# Patient Record
Sex: FEMALE | Race: ASIAN | NOT HISPANIC OR LATINO | Employment: UNEMPLOYED | URBAN - METROPOLITAN AREA
[De-identification: names, ages, dates, MRNs, and addresses within clinical notes are randomized per-mention and may not be internally consistent; named-entity substitution may affect disease eponyms.]

---

## 2017-07-13 ENCOUNTER — OFFICE VISIT (OUTPATIENT)
Dept: FAMILY MEDICINE | Facility: CLINIC | Age: 47
End: 2017-07-13
Payer: COMMERCIAL

## 2017-07-13 VITALS
HEART RATE: 63 BPM | HEIGHT: 61 IN | SYSTOLIC BLOOD PRESSURE: 104 MMHG | OXYGEN SATURATION: 99 % | WEIGHT: 151 LBS | BODY MASS INDEX: 28.51 KG/M2 | DIASTOLIC BLOOD PRESSURE: 74 MMHG | TEMPERATURE: 97 F

## 2017-07-13 DIAGNOSIS — R53.83 OTHER FATIGUE: ICD-10-CM

## 2017-07-13 DIAGNOSIS — N76.0 BV (BACTERIAL VAGINOSIS): ICD-10-CM

## 2017-07-13 DIAGNOSIS — E55.9 VITAMIN D DEFICIENCY: ICD-10-CM

## 2017-07-13 DIAGNOSIS — Z83.49 FAMILY HISTORY OF THYROID DISEASE: ICD-10-CM

## 2017-07-13 DIAGNOSIS — D25.9 UTERINE LEIOMYOMA, UNSPECIFIED LOCATION: ICD-10-CM

## 2017-07-13 DIAGNOSIS — Z80.3 FAMILY HISTORY OF BREAST CANCER: ICD-10-CM

## 2017-07-13 DIAGNOSIS — N89.8 VAGINAL ITCHING: ICD-10-CM

## 2017-07-13 DIAGNOSIS — B96.89 BV (BACTERIAL VAGINOSIS): ICD-10-CM

## 2017-07-13 DIAGNOSIS — E78.5 HYPERLIPIDEMIA LDL GOAL <130: ICD-10-CM

## 2017-07-13 DIAGNOSIS — Z00.00 ROUTINE GENERAL MEDICAL EXAMINATION AT A HEALTH CARE FACILITY: Primary | ICD-10-CM

## 2017-07-13 LAB
DEPRECATED CALCIDIOL+CALCIFEROL SERPL-MC: 16 UG/L (ref 20–75)
ERYTHROCYTE [DISTWIDTH] IN BLOOD BY AUTOMATED COUNT: 13.1 % (ref 10–15)
HCT VFR BLD AUTO: 40 % (ref 35–47)
HGB BLD-MCNC: 13.2 G/DL (ref 11.7–15.7)
MCH RBC QN AUTO: 27.6 PG (ref 26.5–33)
MCHC RBC AUTO-ENTMCNC: 33 G/DL (ref 31.5–36.5)
MCV RBC AUTO: 84 FL (ref 78–100)
MICRO REPORT STATUS: ABNORMAL
PLATELET # BLD AUTO: 168 10E9/L (ref 150–450)
RBC # BLD AUTO: 4.79 10E12/L (ref 3.8–5.2)
SPECIMEN SOURCE: ABNORMAL
WBC # BLD AUTO: 5 10E9/L (ref 4–11)
WET PREP SPEC: ABNORMAL

## 2017-07-13 PROCEDURE — 99213 OFFICE O/P EST LOW 20 MIN: CPT | Mod: 25 | Performed by: FAMILY MEDICINE

## 2017-07-13 PROCEDURE — 36415 COLL VENOUS BLD VENIPUNCTURE: CPT | Performed by: FAMILY MEDICINE

## 2017-07-13 PROCEDURE — 82306 VITAMIN D 25 HYDROXY: CPT | Performed by: FAMILY MEDICINE

## 2017-07-13 PROCEDURE — 85027 COMPLETE CBC AUTOMATED: CPT | Performed by: FAMILY MEDICINE

## 2017-07-13 PROCEDURE — G0145 SCR C/V CYTO,THINLAYER,RESCR: HCPCS | Performed by: FAMILY MEDICINE

## 2017-07-13 PROCEDURE — 87624 HPV HI-RISK TYP POOLED RSLT: CPT | Performed by: FAMILY MEDICINE

## 2017-07-13 PROCEDURE — 84443 ASSAY THYROID STIM HORMONE: CPT | Performed by: FAMILY MEDICINE

## 2017-07-13 PROCEDURE — 99396 PREV VISIT EST AGE 40-64: CPT | Performed by: FAMILY MEDICINE

## 2017-07-13 PROCEDURE — 80061 LIPID PANEL: CPT | Performed by: FAMILY MEDICINE

## 2017-07-13 PROCEDURE — 87210 SMEAR WET MOUNT SALINE/INK: CPT | Performed by: FAMILY MEDICINE

## 2017-07-13 PROCEDURE — 80053 COMPREHEN METABOLIC PANEL: CPT | Performed by: FAMILY MEDICINE

## 2017-07-13 RX ORDER — METRONIDAZOLE 7.5 MG/G
1 GEL VAGINAL AT BEDTIME
Qty: 70 G | Refills: 0 | Status: SHIPPED | OUTPATIENT
Start: 2017-07-13 | End: 2017-07-18

## 2017-07-13 NOTE — MR AVS SNAPSHOT
After Visit Summary   7/13/2017    Giuliana Cameron    MRN: [id number]           Patient Information     Date Of Birth          1970        Visit Information        Provider Department      7/13/2017 8:30 AM [place]eto [place] [facility] [place]        Today's Diagnoses     Routine general medical examination at a health care facility    -  1    Family history of breast cancer        Other fatigue        Hyperlipidemia LDL goal <130        Vitamin D deficiency        Family history of thyroid disease        BMI 28.0-28.9,adult        Vaginal itching        Uterine leiomyoma, unspecified location          Care Instructions      Preventive Health Recommendations  Female Ages 40 to 49    Yearly exam:     See your health care provider every year in order to  1. Review health changes.   2. Discuss preventive care.    3. Review your medicines if your doctor prescribed any.      Get a Pap test every three years (unless you have an abnormal result and your provider advises testing more often).      If you get Pap tests with HPV test, you only need to test every 5 years, unless you have an abnormal result. You do not need a Pap test if your uterus was removed (hysterectomy) and you have not had cancer.      You should be tested each year for STDs (sexually transmitted diseases), if you're at risk.       Ask your doctor if you should have a mammogram.      Have a colonoscopy (test for colon cancer) if someone in your family has had colon cancer or polyps before age 50.       Have a cholesterol test every 5 years.       Have a diabetes test (fasting glucose) after age 45. If you are at risk for diabetes, you should have this test every 3 years.    Shots: Get a flu shot each year. Get a tetanus shot every 10 years.     Nutrition:     Eat at least 5 servings of fruits and vegetables each day.    Eat whole-grain bread, whole-wheat pasta and brown rice instead of white grains and  rice.    Talk to your provider about Calcium and Vitamin D.     Lifestyle    Exercise at least 150 minutes a week (an average of 30 minutes a day, 5 days a week). This will help you control your weight and prevent disease.    Limit alcohol to one drink per day.    No smoking.     Wear sunscreen to prevent skin cancer.    See your dentist every six months for an exam and cleaning.          Follow-ups after your visit        Future tests that were ordered for you today     Open Future Orders        Priority Expected Expires Ordered    *MA Screening Digital Bilateral Routine  7/13/2018 7/13/2017            Who to contact     If you have questions or need follow up information about today's clinic visit or your schedule please contact [facility] [place] directly at [phone].  Normal or non-critical lab and imaging results will be communicated to you by Squrlhart, letter or phone within 4 business days after the clinic has received the results. If you do not hear from us within 7 days, please contact the clinic through Squrlhart or phone. If you have a critical or abnormal lab result, we will notify you by phone as soon as possible.  Submit refill requests through Rank By Search or call your pharmacy and they will forward the refill request to us. Please allow 3 business days for your refill to be completed.          Additional Information About Your Visit        Rank By Search Information     Rank By Search gives you secure access to your electronic health record. If you see a primary care provider, you can also send messages to your care team and make appointments. If you have questions, please call your primary care clinic.  If you do not have a primary care provider, please call [phone] and they will assist you.        Care EveryWhere ID     This is your Care EveryWhere ID. This could be used by other organizations to access your [facility] medical records  [id number]        Your Vitals Were     Pulse Temperature Height  "Last Period Pulse Oximetry BMI (Body Mass Index)    63 97  F (36.1  C) (Tympanic) 5' 1\" (1.549 m) 06/20/2017 (Approximate) 99% 28.53 kg/m2       Blood Pressure from Last 3 Encounters:   07/13/17 104/74   03/16/16 108/78   02/09/15 100/70    Weight from Last 3 Encounters:   07/13/17 151 lb (68.5 kg)   03/16/16 150 lb (68 kg)   02/09/15 152 lb (68.9 kg)              We Performed the Following     CBC with platelets     Comprehensive metabolic panel     HPV High Risk Types DNA Cervical     Lipid panel reflex to direct LDL     Pap imaged thin layer screen with HPV - recommended age 30 - 65 years (select HPV order below)     TSH with free T4 reflex     Vitamin D Deficiency     Wet prep        Primary Care Provider Office Phone # Fax #    June Gregg Prieto [phone]       [facility] [address]  [place] [address]        Equal Access to Services     DANETTE GU : Hadii aad ku hadasho Soomaali, waaxda luqadaha, qaybta kaalmada adeegyada, waxay idiin haytawanan ronak manjarrez . So [facility] [phone].    ATENCIÓN: Si habla español, tiene a ferro disposición servicios gratuitos de asistencia lingüística. Llame al [phone].    We comply with applicable federal civil rights laws and [place] laws. We do not discriminate on the basis of race, color, national origin, age, disability sex, sexual orientation or gender identity.            Thank you!     Thank you for choosing [facility]  for your [facility]. Our goal is always to provide you with excellent care. Hearing back from our patients is one way we can continue to improve our services. Please take a few minutes to complete the written survey that you may receive in the mail after your visit with us. Thank you!             Your Updated Medication List - Protect others around you: Learn how to safely use, store and throw away your medicines at www.disposemymeds.org.          This list is accurate as of: 7/13/17  " 9:16 AM.  Always use your most recent med list.                   Brand Name Dispense Instructions for use Diagnosis    MULTIPLE VITAMIN PO       Routine general medical examination at a health care facility

## 2017-07-13 NOTE — NURSING NOTE
"Chief Complaint   Patient presents with     Physical       Initial /74  Pulse 63  Temp 97  F (36.1  C) (Tympanic)  Ht 5' 1\" (1.549 m)  Wt 151 lb (68.5 kg)  LMP 06/20/2017 (Approximate)  SpO2 99%  BMI 28.53 kg/m2 Estimated body mass index is 28.53 kg/(m^2) as calculated from the following:    Height as of this encounter: 5' 1\" (1.549 m).    Weight as of this encounter: 151 lb (68.5 kg).  Medication Reconciliation: complete  "

## 2017-07-13 NOTE — PROGRESS NOTES
SUBJECTIVE:   CC: Giuliana Cameron is an 46 year old woman who presents for preventive health visit.      Healthy Habits:    Do you get at least three servings of calcium containing foods daily (dairy, green leafy vegetables, etc.)? no, taking calcium and/or vitamin D supplement: no    Amount of exercise or daily activities, outside of work: 3-4 day(s) per week    Problems taking medications regularly not applicable    Medication side effects: No    Have you had an eye exam in the past two years? yes    Do you see a dentist twice per year? yes    Do you have sleep apnea, excessive snoring or daytime drowsiness?yes excessive snoring   Patient says she gotten her period for the first time in 8 months 6/20/2017      PROBLEMS TO ADD ON...     fatigue, weight gain       Duration: ongoing past  several  months , has gained weight and feeling more tired then usual has fam hx of thyroid problem and 2 sisters were recently diagnosed , so worried .     Her menstrual cycle has changed becoming less frequent she skipped for few months but last month had one light period, so she thinks she is going through menopause / she has some hot flashes and sometimes feels sensitive to heat and cold etc. Had low vit-d in the past , had stopped taking supplement so just concerned     Has itchy ears sometimes although no other allergies but just wants ears checked       Today's PHQ-2 Score:   PHQ-2 ( 1999 [employer]) 7/13/2017 3/16/2016   Q1: Little interest or pleasure in doing things 0 0   Q2: Feeling down, depressed or hopeless 0 0   PHQ-2 Score 0 0   Q1: Little interest or pleasure in doing things - -   Q2: Feeling down, depressed or hopeless - -   PHQ-2 Score - -       Abuse: Current or Past(Physical, Sexual or Emotional)- No  Do you feel safe in your environment - Yes    Social History   Substance Use Topics     Smoking status: Never Smoker     Smokeless tobacco: Never Used     Alcohol use No     The patient does not drink >3 drinks  per day nor >7 drinks per week.    Reviewed orders with patient.  Reviewed health maintenance and updated orders accordingly - Yes  Patient Active Problem List   Diagnosis     Dyspepsia and other specified disorders of function of stomach     Allergic rhinitis     Neck pain     Missed period     Sore throat, chronic     Family history of diabetes mellitus (DM)     Esophageal reflux     BMI 28.0-28.9,adult     Hyperlipidemia LDL goal <130     Gastroesophageal reflux disease, esophagitis presence not specified     Chest wall pain     Vitamin D deficiency     Irregular menstrual cycle     Past Surgical History:   Procedure Laterality Date     C  DELIVERY ONLY      , Low Cervical     LAPAROSCOPIC CHOLECYSTECTOMY      Cholecystectomy, Laparoscopic       Social History   Substance Use Topics     Smoking status: Never Smoker     Smokeless tobacco: Never Used     Alcohol use No     Family History   Problem Relation Age of Onset     DIABETES Mother      Hypertension Mother      DIABETES Father      Hypertension Father      C.A.D. Father       of MI at age of 70     Breast Cancer Maternal Grandmother      at age 65+, unsure ?     Breast Cancer Paternal Grandmother      55+     Cancer - colorectal No family hx of            Patient under age 50, mutual decision reflected in health maintenance.      Pertinent mammograms are reviewed under the imaging tab.  History of abnormal Pap smear: NO - age 21-29 PAP every 3 years recommended    Reviewed and updated as needed this visit by clinical staff         Reviewed and updated as needed this visit by Provider        Past Medical History:   Diagnosis Date     Dyspepsia and other specified disorders of function of stomach     pos H.pylori tx'ed     Vitamin D deficiencies         ROS:  C: NEGATIVE for fever, chills, change in weight  CONSTITUTIONAL:POSITIVE  for fatigue and weight gain and NEGATIVE  for anorexia, arthralgias, chills, fever , malaise,  myalgias and sweats  I: NEGATIVE for worrisome rashes, moles or lesions  E: NEGATIVE for vision changes or irritation  ENT: NEGATIVE for ear, mouth and throat problems  R: NEGATIVE for significant cough or SOB  B: NEGATIVE for masses, tenderness or discharge  CV: NEGATIVE for chest pain, palpitations or peripheral edema  GI: NEGATIVE for nausea, abdominal pain, heartburn, or change in bowel habits  : NEGATIVE for unusual urinary or vaginal symptoms except soemtimes itching and feel irritated on and off . Periods are not  Regular and changing , less frequent   M: NEGATIVE for significant arthralgias or myalgia  N: NEGATIVE for weakness, dizziness or paresthesias  ENDOCRINE: as per HPI   P: NEGATIVE for changes in mood or affect    OBJECTIVE:   There were no vitals taken for this visit.  EXAM:  GENERAL: healthy, alert and no distress  EYES: Eyes grossly normal to inspection, PERRL and conjunctivae and sclerae normal  HENT: ear canals and TM's normal, nose and mouth without ulcers or lesions  NECK: no adenopathy, no asymmetry, masses, or scars and thyroid normal to palpation  RESP: lungs clear to auscultation - no rales, rhonchi or wheezes  BREAST: normal without masses, tenderness or nipple discharge and no palpable axillary masses or adenopathy  CV: regular rate and rhythm, normal S1 S2, no S3 or S4, no murmur, click or rub, no peripheral edema   ABDOMEN: soft, nontender, no hepatosplenomegaly, no masses and bowel sounds normal   (female): normal female external genitalia, normal urethral meatus , vaginal mucosa pink, moist, well rugated and normal cervix, adnexae, and uterus slightly bulky without masses.small vaginal discharge  with odor, so wet prep taken   RECTAL: normal sphincter tone, no rectal masses  MS: no gross musculoskeletal defects noted, no edema  SKIN: no suspicious lesions or rashes  NEURO: Normal strength and tone, mentation intact and speech normal  PSYCH: mentation appears normal, affect  "normal/bright    ASSESSMENT/PLAN:   (Z00.00) Routine general medical examination at a health care facility  (primary encounter diagnosis)  Comment:   Plan: *MA Screening Digital Bilateral, Lipid panel         reflex to direct LDL, Pap imaged thin layer         screen with HPV - recommended age 30 - 65 years        (select HPV order below), HPV High Risk Types         DNA Cervical            (Z80.3) Family history of breast cancer  Comment: mgm, pgm  Plan: *MA Screening Digital Bilateral            (R53.83) Other fatigue  Comment:   Plan: TSH with free T4 reflex, Vitamin D Deficiency,         Comprehensive metabolic panel, CBC with         platelets            (E78.5) Hyperlipidemia LDL goal <130  Comment:   Plan:     (E55.9) Vitamin D deficiency  Comment:   Plan:     (Z83.49) Family history of thyroid disease  Comment: 2 sisters , low thyroid   Plan: TSH with free T4 reflex            (Z68.28) BMI 28.0-28.9,adult  Comment:   Plan: Healthy diet and exercise reviewed.    Risks of obesity discussed.  Encourage exercise.      (L29.8) Vaginal itching  Comment:   Plan: Wet prep          (N76.0,  B96.89) BV (bacterial vaginosis)  Comment:   Plan: metroNIDAZOLE (METROGEL) 0.75 % vaginal gel    (D25.9) Uterine leiomyoma, unspecified location  Comment: per previous u/s, asymptomatic   Plan: ok to watch     COUNSELING:   Reviewed preventive health counseling, as reflected in patient instructions       Regular exercise       Healthy diet/nutrition       Vision screening         reports that she has never smoked. She has never used smokeless tobacco.    Estimated body mass index is 28.11 kg/(m^2) as calculated from the following:    Height as of 3/16/16: 5' 1.25\" (1.556 m).    Weight as of 3/16/16: 150 lb (68 kg).   Weight management plan: Discussed healthy diet and exercise guidelines and patient will follow up in 12 months in clinic to re-evaluate.      HCM issues discussed. Diet/ exercise discussed. Check labs , call patiens " with results. follow up as needed.       Counseling Resources:  ATP IV Guidelines  Pooled Cohorts Equation Calculator  Breast Cancer Risk Calculator  FRAX Risk Assessment  ICSI Preventive Guidelines  Dietary Guidelines for Americans, 2010  USDA's MyPlate  ASA Prophylaxis  Lung CA Screening    June Prieto MD  [facility]

## 2017-07-14 LAB
ALBUMIN SERPL-MCNC: 4.2 G/DL (ref 3.4–5)
ALP SERPL-CCNC: 91 U/L (ref 40–150)
ALT SERPL W P-5'-P-CCNC: 52 U/L (ref 0–50)
ANION GAP SERPL CALCULATED.3IONS-SCNC: 9 MMOL/L (ref 3–14)
AST SERPL W P-5'-P-CCNC: 28 U/L (ref 0–45)
BILIRUB SERPL-MCNC: 0.4 MG/DL (ref 0.2–1.3)
BUN SERPL-MCNC: 9 MG/DL (ref 7–30)
CALCIUM SERPL-MCNC: 9 MG/DL (ref 8.5–10.1)
CHLORIDE SERPL-SCNC: 105 MMOL/L (ref 94–109)
CHOLEST SERPL-MCNC: 204 MG/DL
CO2 SERPL-SCNC: 27 MMOL/L (ref 20–32)
CREAT SERPL-MCNC: 0.7 MG/DL (ref 0.52–1.04)
GFR SERPL CREATININE-BSD FRML MDRD: 88 ML/MIN/1.7M2
GLUCOSE SERPL-MCNC: 91 MG/DL (ref 70–99)
HDLC SERPL-MCNC: 53 MG/DL
LDLC SERPL CALC-MCNC: 127 MG/DL
NONHDLC SERPL-MCNC: 151 MG/DL
POTASSIUM SERPL-SCNC: 4.3 MMOL/L (ref 3.4–5.3)
PROT SERPL-MCNC: 7.7 G/DL (ref 6.8–8.8)
SODIUM SERPL-SCNC: 141 MMOL/L (ref 133–144)
TRIGL SERPL-MCNC: 121 MG/DL
TSH SERPL DL<=0.005 MIU/L-ACNC: 1.83 MU/L (ref 0.4–4)

## 2017-07-17 LAB
COPATH REPORT: NORMAL
PAP: NORMAL

## 2017-07-19 ENCOUNTER — MYC MEDICAL ADVICE (OUTPATIENT)
Dept: FAMILY MEDICINE | Facility: CLINIC | Age: 47
End: 2017-07-19

## 2017-07-19 LAB
FINAL DIAGNOSIS: NORMAL
HPV HR 12 DNA CVX QL NAA+PROBE: NEGATIVE
HPV16 DNA SPEC QL NAA+PROBE: NEGATIVE
HPV18 DNA SPEC QL NAA+PROBE: NEGATIVE
SPECIMEN DESCRIPTION: NORMAL

## 2017-07-20 ENCOUNTER — TELEPHONE (OUTPATIENT)
Dept: FAMILY MEDICINE | Facility: CLINIC | Age: 47
End: 2017-07-20

## 2017-07-20 NOTE — TELEPHONE ENCOUNTER
Please review labs and advise,  No lab notes  Bharti Cortés RN - Triage  [facility]

## 2017-07-20 NOTE — TELEPHONE ENCOUNTER
Spoke with patient, she was not finding glucose in ordered labs. We discussed it was in the metabolic panel and results were normal. Patient/ parent verbalized understanding and agrees with plan.    Ngoc Garcia RN   [facility] - Triage

## 2017-07-20 NOTE — TELEPHONE ENCOUNTER
Reason for Call:  Request for results:    Name of test or procedure: lab results    Date of test of procedure: 07/13/17    Location of the test or procedure: [facility]    OK to leave the result message on voice mail or with a family member? YES    Phone number Patient can be reached at:  Cell number on file:    Telephone Information:   Mobile [phone]       Additional comments: Patient got a message that her lab results were now viewable in [employer]. Patient states she cannot find some of her labs that were supposed to be done. Please call patient back to discuss.    Call taken on 7/20/2017 at 4:28 PM by Jeanette Abarca

## 2017-08-18 ENCOUNTER — TELEPHONE (OUTPATIENT)
Dept: FAMILY MEDICINE | Facility: CLINIC | Age: 47
End: 2017-08-18

## 2017-08-22 NOTE — TELEPHONE ENCOUNTER
Form faxed to [facility] [phone]  Original and confirmation fax mailed to patient  Copy sent to stat abstracting  Nicole Cevallos TC

## 2017-12-07 ENCOUNTER — HOSPITAL ENCOUNTER (OUTPATIENT)
Dept: MAMMOGRAPHY | Facility: CLINIC | Age: 47
Discharge: HOME | End: 2017-12-07
Attending: FAMILY MEDICINE | Admitting: FAMILY MEDICINE
Payer: COMMERCIAL

## 2017-12-07 DIAGNOSIS — Z80.3 FAMILY HISTORY OF BREAST CANCER: ICD-10-CM

## 2017-12-07 DIAGNOSIS — Z00.00 ROUTINE GENERAL MEDICAL EXAMINATION AT A HEALTH CARE FACILITY: ICD-10-CM

## 2017-12-07 PROCEDURE — G0202 SCR MAMMO BI INCL CAD: HCPCS

## 2018-04-24 ENCOUNTER — OFFICE VISIT (OUTPATIENT)
Dept: FAMILY MEDICINE | Facility: CLINIC | Age: 48
End: 2018-04-24
Payer: COMMERCIAL

## 2018-04-24 VITALS
DIASTOLIC BLOOD PRESSURE: 73 MMHG | HEIGHT: 61 IN | WEIGHT: 155 LBS | HEART RATE: 78 BPM | SYSTOLIC BLOOD PRESSURE: 109 MMHG | TEMPERATURE: 98 F | OXYGEN SATURATION: 100 % | BODY MASS INDEX: 29.27 KG/M2

## 2018-04-24 DIAGNOSIS — R22.9 LOCALIZED SUPERFICIAL SWELLING, MASS, OR LUMP: Primary | ICD-10-CM

## 2018-04-24 PROCEDURE — 99213 OFFICE O/P EST LOW 20 MIN: CPT | Performed by: FAMILY MEDICINE

## 2018-04-24 NOTE — MR AVS SNAPSHOT
After Visit Summary   4/24/2018    Giuliana Cameron    MRN: [id number]           Patient Information     Date Of Birth          1970        Visit Information        Provider Department      4/24/2018 1:00 PM June Prieto MD [facility] [place]        Today's Diagnoses     Localized superficial swelling, mass, or lump    -  1      Care Instructions    Proceed with u/s  Cares and symptomatic treatment discussed follow up if problem               Follow-ups after your visit        Follow-up notes from your care team     Return in about 1 month (around 5/24/2018), or sooner if problem .      Future tests that were ordered for you today     Open Future Orders        Priority Expected Expires Ordered    POC US SOFT TISSUE Routine 4/24/2018 4/28/2018 4/24/2018            Who to contact     If you have questions or need follow up information about today's clinic visit or your schedule please contact [facility] [place] directly at [phone].  Normal or non-critical lab and imaging results will be communicated to you by Bluefin Labshart, letter or phone within 4 business days after the clinic has received the results. If you do not hear from us within 7 days, please contact the clinic through Bluefin Labshart or phone. If you have a critical or abnormal lab result, we will notify you by phone as soon as possible.  Submit refill requests through [employer]L3B Digital or call your pharmacy and they will forward the refill request to us. Please allow 3 business days for your refill to be completed.          Additional Information About Your Visit        MyChart Information     [employer] Digital gives you secure access to your electronic health record. If you see a primary care provider, you can also send messages to your care team and make appointments. If you have questions, please call your primary care clinic.  If you do not have a primary care provider, please call [phone] and they will assist you.       "  Care EveryWhere ID     This is your Care EveryWhere ID. This could be used by other organizations to access your [facility] medical records  [id number]        Your Vitals Were     Pulse Temperature Height Pulse Oximetry BMI (Body Mass Index)       78 98  F (36.7  C) (Tympanic) 5' 1\" (1.549 m) 100% 29.29 kg/m2        Blood Pressure from Last 3 Encounters:   04/24/18 109/73   07/13/17 104/74   03/16/16 108/78    Weight from Last 3 Encounters:   04/24/18 155 lb (70.3 kg)   07/13/17 151 lb (68.5 kg)   03/16/16 150 lb (68 kg)               Primary Care Provider Office Phone # Fax #    June Gregg Prieto [phone]        [address]  [place] [address]        Equal Access to Services     [facility]: Hadii aad ku hadasho Soomaali, waaxda luqadaha, qaybta kaalmada adeegyada, waxay blairin haytawanan ronak jainn . So [facility] [phone].    ATENCIÓN: Si habla español, tiene a ferro disposición servicios gratuitos de asistencia lingüística. Llame al [phone].    We comply with applicable federal civil rights laws and [place] laws. We do not discriminate on the basis of race, color, national origin, age, disability, sex, sexual orientation, or gender identity.            Thank you!     Thank you for choosing [facility] [place]  for your care. Our goal is always to provide you with excellent care. Hearing back from our patients is one way we can continue to improve our services. Please take a few minutes to complete the written survey that you may receive in the mail after your visit with us. Thank you!             Your Updated Medication List - Protect others around you: Learn how to safely use, store and throw away your medicines at www.disposemymeds.org.      Notice  As of 4/24/2018  1:42 PM    You have not been prescribed any medications.      "

## 2018-04-24 NOTE — PROGRESS NOTES
SUBJECTIVE:   Giuliana Cameron is a 47 year old female who presents to clinic today for the following health issues:      Derm Problem       Duration: ongoing 20+ years     Description  Location: right armpit , has it for 15-20 yrs may be,although it feels like it may been changed lately and feeling more tender and painful sometimes more of  mid discomfort and  it feel different. So just concerned .   Tenderness: mild  Growing in size: Yes . no correlation with menstrual cycle is changes although she thinsk she is going rough menopause as has not period was 4-5 months ago no ht flashes  etc . Denies any breast lump or breast tenderness. Had normal  mammogram in 2017      Intensity:      Accompanying signs and symptoms: None    History (similar episodes/previous evaluation): None    Precipitating or alleviating factors:  New exposures:  None  Recent travel: no      Therapies tried and outcome: none      Problem list and histories reviewed & adjusted, as indicated.  Additional history: as documented    Patient Active Problem List   Diagnosis     Dyspepsia and other specified disorders of function of stomach     Allergic rhinitis     Neck pain     Sore throat, chronic     Family history of diabetes mellitus (DM)     Esophageal reflux     BMI 28.0-28.9,adult     Hyperlipidemia LDL goal <130     Gastroesophageal reflux disease, esophagitis presence not specified     Chest wall pain     Vitamin D deficiency     Irregular menstrual cycle     Family history of thyroid disease     Uterine leiomyoma, unspecified location     Past Surgical History:   Procedure Laterality Date     C  DELIVERY ONLY      , Low Cervical     LAPAROSCOPIC CHOLECYSTECTOMY      Cholecystectomy, Laparoscopic       Social History   Substance Use Topics     Smoking status: Never Smoker     Smokeless tobacco: Never Used     Alcohol use No     Family History   Problem Relation Age of Onset     DIABETES Mother       "Hypertension Mother      DIABETES Father      Hypertension Father      C.A.D. Father       of MI at age of 70     Breast Cancer Maternal Grandmother      at age 65+, unsure ?     Breast Cancer Paternal Grandmother      55+     Cancer - colorectal No family hx of            Reviewed and updated as needed this visit by clinical staff       Reviewed and updated as needed this visit by Provider         ROS:  Constitutional, HEENT, cardiovascular, pulmonary, GI, , musculoskeletal, neuro, skin, endocrine and psych systems are negative, except as otherwise noted.    OBJECTIVE:     /73  Pulse 78  Temp 98  F (36.7  C) (Tympanic)  Ht 5' 1\" (1.549 m)  Wt 155 lb (70.3 kg)  SpO2 100%  BMI 29.29 kg/m2  Body mass index is 29.29 kg/(m^2).  GENERAL: healthy, alert and no distress  NECK: no adenopathy, no asymmetry,   RESP: lungs clear to auscultation - no rales, rhonchi or wheezes  BREAST: normal without masses, tenderness or nipple discharge and no palpable axillary masses or adenopathy  Has soft tissue lump left axilla anteriorly / adjacent to chest, it does look more prominent as compared her other side she is somewhat tender in that area,  Suspect lipoma ?   ROM of shoulder does not aggravate any pain   CV: regular rate and rhythm, normal S1 S2, no S3 or S4, no murmur, click or rub, no peripheral edema and peripheral pulses strong  ABDOMEN: soft, nontender, no hepatosplenomegaly, no masses and bowel sounds normal            ASSESSMENT/PLAN:       (R22.9) Localized superficial swelling, mass, or lump  (primary encounter diagnosis)  Comment: rt axilla/ breast area , likely lipoma  Plan: POC US SOFT TISSUE          Has lump left axilla anteriorly / adjacent to chest, breast- for many  years, increased in size and painful lately. Suspect lipoma ? Vs other      proceed with evaluation with u/s  . Ok to take OTC pain med's as needed .   Cares and symptomatic treatment discussed follow up if problem       Patient " expressed understanding and agreement with treatment plan. All patient's questions were answered, will let me know if has more later.  Medications: Rx's: Reviewed the potential side effects/complications of medications prescribed.         June Prieto MD  [facility] [place]

## 2018-04-24 NOTE — NURSING NOTE
"Chief Complaint   Patient presents with     Derm Problem       Initial /73  Pulse 78  Temp 98  F (36.7  C) (Tympanic)  Ht 5' 1\" (1.549 m)  Wt 155 lb (70.3 kg)  SpO2 100%  BMI 29.29 kg/m2 Estimated body mass index is 29.29 kg/(m^2) as calculated from the following:    Height as of this encounter: 5' 1\" (1.549 m).    Weight as of this encounter: 155 lb (70.3 kg).  Medication Reconciliation: complete  "

## 2018-04-25 ENCOUNTER — TELEPHONE (OUTPATIENT)
Dept: FAMILY MEDICINE | Facility: CLINIC | Age: 48
End: 2018-04-25

## 2018-04-25 NOTE — TELEPHONE ENCOUNTER
Reason for Call:  Other- Order    Detailed comments: Pt need order to be faxed to [facility] from Dr Prieto to make an appt, she is unable to make an appt because [facility] does not have order.    Phone Number Patient can be reached at: [phone]    Best Time: anytime    Can we leave a detailed message on this number? YES    Call taken on 4/25/2018 at 6:33 PM by Kiley Godinez

## 2018-04-26 NOTE — TELEPHONE ENCOUNTER
Order was placed with no location entered; could of been due to where patient could get it done at  TC faxed order to [facility] [phone]  Nicole STYLES

## 2018-05-01 ENCOUNTER — TRANSFERRED RECORDS (OUTPATIENT)
Dept: HEALTH INFORMATION MANAGEMENT | Facility: CLINIC | Age: 48
End: 2018-05-01

## 2018-05-02 ENCOUNTER — TELEPHONE (OUTPATIENT)
Dept: FAMILY MEDICINE | Facility: CLINIC | Age: 48
End: 2018-05-02

## 2018-05-02 NOTE — TELEPHONE ENCOUNTER
Please let her know that her ultrasound is normal    Ultrasound shows asymmetric fat deposition,  no discrete lipoma or other soft tissue mass lesion detected in the area of her concern.

## 2018-05-02 NOTE — TELEPHONE ENCOUNTER
I guess I am not sure if insurance would approve, bc it may come under cosmetic surgery.   May be have her schedule follow up visit to discuss in further detail, to see if we can help her make a case for her for insurance coverage

## 2018-05-02 NOTE — TELEPHONE ENCOUNTER
Patient notified of information noted below from provider and agrees with plan.   Janea Hernandez RN Triage  [facility]

## 2018-05-02 NOTE — TELEPHONE ENCOUNTER
Patient notified with information noted below from provider and would like to know where to go from here. She is uncomfortable on that side and would like to know if possible to have surgically removed or how to proceed?    Please advise,  Bharti Cortés RN - Triage  [facility]

## 2019-03-06 ENCOUNTER — TELEPHONE (OUTPATIENT)
Dept: FAMILY MEDICINE | Facility: CLINIC | Age: 49
End: 2019-03-06

## 2019-03-06 NOTE — TELEPHONE ENCOUNTER
TIAGO Prieto    Patient had wellness check at her work, [employer].   Patient states that her glucose and BP are fine. Total cholesterol 237, HDL 61, , Ratio 3.9, Triglycerides 157.    Patient scheduled with Dr. Prieto for 4/18. States that she will work on diet and exercise until that time.  Marlin Booth RN

## 2019-04-18 ENCOUNTER — OFFICE VISIT (OUTPATIENT)
Dept: FAMILY MEDICINE | Facility: CLINIC | Age: 49
End: 2019-04-18
Payer: COMMERCIAL

## 2019-04-18 VITALS
DIASTOLIC BLOOD PRESSURE: 72 MMHG | TEMPERATURE: 98.3 F | HEART RATE: 88 BPM | SYSTOLIC BLOOD PRESSURE: 118 MMHG | WEIGHT: 155 LBS | BODY MASS INDEX: 29.27 KG/M2 | OXYGEN SATURATION: 98 % | HEIGHT: 61 IN

## 2019-04-18 DIAGNOSIS — E78.5 HYPERLIPIDEMIA LDL GOAL <130: ICD-10-CM

## 2019-04-18 DIAGNOSIS — Z00.00 ROUTINE HISTORY AND PHYSICAL EXAMINATION OF ADULT: Primary | ICD-10-CM

## 2019-04-18 DIAGNOSIS — R53.83 OTHER FATIGUE: ICD-10-CM

## 2019-04-18 DIAGNOSIS — R06.83 SNORING: ICD-10-CM

## 2019-04-18 DIAGNOSIS — K21.9 GASTROESOPHAGEAL REFLUX DISEASE, ESOPHAGITIS PRESENCE NOT SPECIFIED: ICD-10-CM

## 2019-04-18 DIAGNOSIS — Z12.11 COLON CANCER SCREENING: ICD-10-CM

## 2019-04-18 DIAGNOSIS — R09.81 NASAL CONGESTION: ICD-10-CM

## 2019-04-18 DIAGNOSIS — E55.9 VITAMIN D DEFICIENCY: ICD-10-CM

## 2019-04-18 PROBLEM — Z78.0 MENOPAUSE PRESENT: Status: ACTIVE | Noted: 2019-04-18

## 2019-04-18 LAB
ERYTHROCYTE [DISTWIDTH] IN BLOOD BY AUTOMATED COUNT: 13.2 % (ref 10–15)
HCT VFR BLD AUTO: 40.5 % (ref 35–47)
HGB BLD-MCNC: 13.7 G/DL (ref 11.7–15.7)
MCH RBC QN AUTO: 27.9 PG (ref 26.5–33)
MCHC RBC AUTO-ENTMCNC: 33.8 G/DL (ref 31.5–36.5)
MCV RBC AUTO: 83 FL (ref 78–100)
PLATELET # BLD AUTO: 187 10E9/L (ref 150–450)
RBC # BLD AUTO: 4.91 10E12/L (ref 3.8–5.2)
WBC # BLD AUTO: 5.8 10E9/L (ref 4–11)

## 2019-04-18 PROCEDURE — 85027 COMPLETE CBC AUTOMATED: CPT | Performed by: FAMILY MEDICINE

## 2019-04-18 PROCEDURE — 82274 ASSAY TEST FOR BLOOD FECAL: CPT | Performed by: FAMILY MEDICINE

## 2019-04-18 PROCEDURE — 84443 ASSAY THYROID STIM HORMONE: CPT | Performed by: FAMILY MEDICINE

## 2019-04-18 PROCEDURE — 80053 COMPREHEN METABOLIC PANEL: CPT | Performed by: FAMILY MEDICINE

## 2019-04-18 PROCEDURE — 99396 PREV VISIT EST AGE 40-64: CPT | Performed by: FAMILY MEDICINE

## 2019-04-18 PROCEDURE — 80061 LIPID PANEL: CPT | Performed by: FAMILY MEDICINE

## 2019-04-18 PROCEDURE — 82306 VITAMIN D 25 HYDROXY: CPT | Performed by: FAMILY MEDICINE

## 2019-04-18 PROCEDURE — 36415 COLL VENOUS BLD VENIPUNCTURE: CPT | Performed by: FAMILY MEDICINE

## 2019-04-18 PROCEDURE — 99214 OFFICE O/P EST MOD 30 MIN: CPT | Mod: 25 | Performed by: FAMILY MEDICINE

## 2019-04-18 RX ORDER — FLUTICASONE PROPIONATE 50 MCG
1-2 SPRAY, SUSPENSION (ML) NASAL DAILY
Qty: 16 G | Status: SHIPPED | OUTPATIENT
Start: 2019-04-18 | End: 2022-10-17

## 2019-04-18 RX ORDER — NICOTINE POLACRILEX 4 MG/1
20 GUM, CHEWING ORAL DAILY
Qty: 90 TABLET | Refills: 1 | Status: SHIPPED | OUTPATIENT
Start: 2019-04-18 | End: 2022-10-17

## 2019-04-18 ASSESSMENT — MIFFLIN-ST. JEOR: SCORE: 1260.46
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
## 2019-04-19 LAB
ALBUMIN SERPL-MCNC: 4.2 G/DL (ref 3.4–5)
ALP SERPL-CCNC: 96 U/L (ref 40–150)
ALT SERPL W P-5'-P-CCNC: 51 U/L (ref 0–50)
ANION GAP SERPL CALCULATED.3IONS-SCNC: 9 MMOL/L (ref 3–14)
AST SERPL W P-5'-P-CCNC: 35 U/L (ref 0–45)
BILIRUB SERPL-MCNC: 0.4 MG/DL (ref 0.2–1.3)
BUN SERPL-MCNC: 12 MG/DL (ref 7–30)
CALCIUM SERPL-MCNC: 9.2 MG/DL (ref 8.5–10.1)
CHLORIDE SERPL-SCNC: 107 MMOL/L (ref 94–109)
CHOLEST SERPL-MCNC: 217 MG/DL
CO2 SERPL-SCNC: 23 MMOL/L (ref 20–32)
CREAT SERPL-MCNC: 0.72 MG/DL (ref 0.52–1.04)
DEPRECATED CALCIDIOL+CALCIFEROL SERPL-MC: 21 UG/L (ref 20–75)
GFR SERPL CREATININE-BSD FRML MDRD: >90 ML/MIN/{1.73_M2}
GLUCOSE SERPL-MCNC: 93 MG/DL (ref 70–99)
HDLC SERPL-MCNC: 57 MG/DL
LDLC SERPL CALC-MCNC: 141 MG/DL
NONHDLC SERPL-MCNC: 160 MG/DL
POTASSIUM SERPL-SCNC: 4.3 MMOL/L (ref 3.4–5.3)
PROT SERPL-MCNC: 8.2 G/DL (ref 6.8–8.8)
SODIUM SERPL-SCNC: 139 MMOL/L (ref 133–144)
TRIGL SERPL-MCNC: 95 MG/DL
TSH SERPL DL<=0.005 MIU/L-ACNC: 1.78 MU/L (ref 0.4–4)

## 2019-04-22 DIAGNOSIS — Z00.00 ROUTINE HISTORY AND PHYSICAL EXAMINATION OF ADULT: ICD-10-CM

## 2019-04-22 DIAGNOSIS — Z12.11 COLON CANCER SCREENING: ICD-10-CM

## 2019-04-22 LAB — HEMOCCULT STL QL IA: NEGATIVE

## 2019-05-08 ENCOUNTER — HOSPITAL ENCOUNTER (OUTPATIENT)
Dept: MAMMOGRAPHY | Facility: CLINIC | Age: 49
Discharge: HOME | End: 2019-05-08
Attending: FAMILY MEDICINE | Admitting: FAMILY MEDICINE
Payer: COMMERCIAL

## 2019-05-08 DIAGNOSIS — Z00.00 ROUTINE HISTORY AND PHYSICAL EXAMINATION OF ADULT: ICD-10-CM

## 2019-05-08 PROCEDURE — G0279 TOMOSYNTHESIS, MAMMO: HCPCS

## 2019-05-08 PROCEDURE — 77066 DX MAMMO INCL CAD BI: CPT

## 2019-05-08 PROCEDURE — 77063 BREAST TOMOSYNTHESIS BI: CPT

## 2019-11-04 ENCOUNTER — HEALTH MAINTENANCE LETTER (OUTPATIENT)
Age: 49
End: 2019-11-04

## 2020-03-12 ENCOUNTER — TRANSFERRED RECORDS (OUTPATIENT)
Dept: HEALTH INFORMATION MANAGEMENT | Facility: CLINIC | Age: 50
End: 2020-03-12

## 2020-09-05 ENCOUNTER — TRANSFERRED RECORDS (OUTPATIENT)
Dept: HEALTH INFORMATION MANAGEMENT | Facility: CLINIC | Age: 50
End: 2020-09-05

## 2020-11-16 ENCOUNTER — HEALTH MAINTENANCE LETTER (OUTPATIENT)
Age: 50
End: 2020-11-16

## 2021-02-07 ENCOUNTER — HEALTH MAINTENANCE LETTER (OUTPATIENT)
Age: 51
End: 2021-02-07

## 2021-04-07 ENCOUNTER — OFFICE VISIT (OUTPATIENT)
Dept: FAMILY MEDICINE | Facility: CLINIC | Age: 51
End: 2021-04-07
Payer: COMMERCIAL

## 2021-04-07 VITALS
TEMPERATURE: 96.7 F | SYSTOLIC BLOOD PRESSURE: 110 MMHG | OXYGEN SATURATION: 98 % | DIASTOLIC BLOOD PRESSURE: 70 MMHG | WEIGHT: 144 LBS | BODY MASS INDEX: 27.19 KG/M2 | HEIGHT: 61 IN | HEART RATE: 64 BPM

## 2021-04-07 DIAGNOSIS — Z12.4 SCREENING FOR MALIGNANT NEOPLASM OF CERVIX: ICD-10-CM

## 2021-04-07 DIAGNOSIS — Z78.0 MENOPAUSE PRESENT: ICD-10-CM

## 2021-04-07 DIAGNOSIS — Z78.9 STRICT VEGETARIAN DIET: ICD-10-CM

## 2021-04-07 DIAGNOSIS — Z13.220 SCREENING FOR HYPERLIPIDEMIA: ICD-10-CM

## 2021-04-07 DIAGNOSIS — R68.89 COLD INTOLERANCE: ICD-10-CM

## 2021-04-07 DIAGNOSIS — E55.9 VITAMIN D DEFICIENCY: ICD-10-CM

## 2021-04-07 DIAGNOSIS — Z00.00 ROUTINE GENERAL MEDICAL EXAMINATION AT A HEALTH CARE FACILITY: Primary | ICD-10-CM

## 2021-04-07 DIAGNOSIS — Z11.4 SCREENING FOR HIV (HUMAN IMMUNODEFICIENCY VIRUS): ICD-10-CM

## 2021-04-07 DIAGNOSIS — Z12.11 COLON CANCER SCREENING: ICD-10-CM

## 2021-04-07 DIAGNOSIS — Z12.31 BREAST CANCER SCREENING BY MAMMOGRAM: ICD-10-CM

## 2021-04-07 DIAGNOSIS — Z11.59 NEED FOR HEPATITIS C SCREENING TEST: ICD-10-CM

## 2021-04-07 LAB
CHOLEST SERPL-MCNC: 200 MG/DL
DEPRECATED CALCIDIOL+CALCIFEROL SERPL-MC: 49 UG/L (ref 20–75)
ERYTHROCYTE [DISTWIDTH] IN BLOOD BY AUTOMATED COUNT: 12.7 % (ref 10–15)
FOLATE SERPL-MCNC: 20.3 NG/ML
GLUCOSE SERPL-MCNC: 107 MG/DL (ref 70–99)
HCT VFR BLD AUTO: 37 % (ref 35–47)
HCV AB SERPL QL IA: NONREACTIVE
HDLC SERPL-MCNC: 55 MG/DL
HGB BLD-MCNC: 12.9 G/DL (ref 11.7–15.7)
HIV 1+2 AB+HIV1 P24 AG SERPL QL IA: NONREACTIVE
LDLC SERPL CALC-MCNC: 122 MG/DL
MCH RBC QN AUTO: 28.9 PG (ref 26.5–33)
MCHC RBC AUTO-ENTMCNC: 34.9 G/DL (ref 31.5–36.5)
MCV RBC AUTO: 83 FL (ref 78–100)
NONHDLC SERPL-MCNC: 145 MG/DL
PLATELET # BLD AUTO: 180 10E9/L (ref 150–450)
RBC # BLD AUTO: 4.47 10E12/L (ref 3.8–5.2)
TRIGL SERPL-MCNC: 115 MG/DL
TSH SERPL DL<=0.005 MIU/L-ACNC: 1.69 MU/L (ref 0.4–4)
VIT B12 SERPL-MCNC: 140 PG/ML (ref 193–986)
WBC # BLD AUTO: 4.3 10E9/L (ref 4–11)

## 2021-04-07 PROCEDURE — 85027 COMPLETE CBC AUTOMATED: CPT | Performed by: FAMILY MEDICINE

## 2021-04-07 PROCEDURE — G0145 SCR C/V CYTO,THINLAYER,RESCR: HCPCS | Performed by: FAMILY MEDICINE

## 2021-04-07 PROCEDURE — 82947 ASSAY GLUCOSE BLOOD QUANT: CPT | Performed by: FAMILY MEDICINE

## 2021-04-07 PROCEDURE — 82746 ASSAY OF FOLIC ACID SERUM: CPT | Performed by: FAMILY MEDICINE

## 2021-04-07 PROCEDURE — 87624 HPV HI-RISK TYP POOLED RSLT: CPT | Performed by: FAMILY MEDICINE

## 2021-04-07 PROCEDURE — 82306 VITAMIN D 25 HYDROXY: CPT | Performed by: FAMILY MEDICINE

## 2021-04-07 PROCEDURE — 99213 OFFICE O/P EST LOW 20 MIN: CPT | Mod: 25 | Performed by: FAMILY MEDICINE

## 2021-04-07 PROCEDURE — 99396 PREV VISIT EST AGE 40-64: CPT | Performed by: FAMILY MEDICINE

## 2021-04-07 PROCEDURE — 84443 ASSAY THYROID STIM HORMONE: CPT | Performed by: FAMILY MEDICINE

## 2021-04-07 PROCEDURE — 86803 HEPATITIS C AB TEST: CPT | Performed by: FAMILY MEDICINE

## 2021-04-07 PROCEDURE — 80061 LIPID PANEL: CPT | Performed by: FAMILY MEDICINE

## 2021-04-07 PROCEDURE — 36415 COLL VENOUS BLD VENIPUNCTURE: CPT | Performed by: FAMILY MEDICINE

## 2021-04-07 PROCEDURE — 82607 VITAMIN B-12: CPT | Performed by: FAMILY MEDICINE

## 2021-04-07 PROCEDURE — 87389 HIV-1 AG W/HIV-1&-2 AB AG IA: CPT | Performed by: FAMILY MEDICINE

## 2021-04-07 ASSESSMENT — MIFFLIN-ST. JEOR: SCORE: 1207.17

## 2021-04-07 NOTE — PROGRESS NOTES
SUBJECTIVE:   CC: Giuliana Cameron is an 50 year old woman who presents for preventive health visit.       Patient has been advised of split billing requirements and indicates understanding: Yes  Healthy Habits:    Do you get at least three servings of calcium containing foods daily (dairy, green leafy vegetables, etc.)? yes    Amount of exercise or daily activities, outside of work: 3 day(s) per week    Problems taking medications regularly not applicable    Medication side effects: No    Have you had an eye exam in the past two years? yes    Do you see a dentist twice per year? yes    Do you have sleep apnea, excessive snoring or daytime drowsiness?Snores at night      PROBLEMS TO ADD ON...  She is concerned with intolerance to cold and always feel cold and achy sometimes. No joint pain etc. Has hx of low vit-d so wants to have test today. Trying to take vit-d. Also she has had low iron,  so wants recheck   Has fam hx of thyroid problem in her sister so wants thyroid checked. she is strict vegetarian although sometimes eats eggs       Today's PHQ-2 Score:   PHQ-2 ( 1999 Pfizer) 4/7/2021 4/18/2019   Q1: Little interest or pleasure in doing things 0 0   Q2: Feeling down, depressed or hopeless 0 0   PHQ-2 Score 0 0   Q1: Little interest or pleasure in doing things - -   Q2: Feeling down, depressed or hopeless - -   PHQ-2 Score - -       Abuse: Current or Past(Physical, Sexual or Emotional)- No  Do you feel safe in your environment? Yes    Have you ever done Advance Care Planning? (For example, a Health Directive, POLST, or a discussion with a medical provider or your loved ones about your wishes): No, advance care planning information given to patient to review.  Patient plans to discuss their wishes with loved ones or provider.      Social History     Tobacco Use     Smoking status: Never Smoker     Smokeless tobacco: Never Used   Substance Use Topics     Alcohol use: No     If you drink alcohol do you  typically have >3 drinks per day or >7 drinks per week? No                     Reviewed orders with patient.  Reviewed health maintenance and updated orders accordingly - Yes  Patient Active Problem List   Diagnosis     Dyspepsia and other specified disorders of function of stomach     Allergic rhinitis     Neck pain     Sore throat, chronic     Family history of diabetes mellitus (DM)     Esophageal reflux     Hyperlipidemia LDL goal <130     Gastroesophageal reflux disease without esophagitis     Chest wall pain     Vitamin D deficiency     Irregular menstrual cycle     Family history of thyroid disease     Uterine leiomyoma, unspecified location     Colon cancer screening     Nasal congestion     Menopause present     Past Surgical History:   Procedure Laterality Date     C  DELIVERY ONLY      , Low Cervical     LAPAROSCOPIC CHOLECYSTECTOMY      Cholecystectomy, Laparoscopic       Social History     Tobacco Use     Smoking status: Never Smoker     Smokeless tobacco: Never Used   Substance Use Topics     Alcohol use: No     Family History   Problem Relation Age of Onset     Diabetes Mother      Hypertension Mother      Diabetes Father      Hypertension Father      C.A.D. Father          of MI at age of 70     Breast Cancer Maternal Grandmother         at age 65+, unsure ?     Breast Cancer Paternal Grandmother         55+     Cancer - colorectal No family hx of            FSH-7: No flowsheet data found.    Mammogram Screening: Recommended annual mammography  Pertinent mammograms are reviewed under the imaging tab.    Pertinent mammograms are reviewed under the imaging tab.  History of abnormal Pap smear: NO - age 30- 65 PAP every 3 years recommended  PAP / HPV Latest Ref Rng & Units 2017   PAP - NIL NIL NIL   HPV 16 DNA NEG Negative - -   HPV 18 DNA NEG Negative - -   OTHER HR HPV NEG Negative - -     Reviewed and updated as needed this visit by clinical  "staff  Tobacco  Allergies  Meds              Reviewed and updated as needed this visit by Provider                Past Medical History:   Diagnosis Date     Dyspepsia and other specified disorders of function of stomach 2003    pos H.pylori tx'ed     Vitamin D deficiencies 2011        ROS:  CONSTITUTIONAL: NEGATIVE for fever, chills, change in weight  INTEGUMENTARU/SKIN: NEGATIVE for worrisome rashes, moles or lesions  EYES: NEGATIVE for vision changes or irritation  ENT: NEGATIVE for ear, mouth and throat problems  RESP: NEGATIVE for significant cough or SOB  BREAST: NEGATIVE for masses, tenderness or discharge  CV: NEGATIVE for chest pain, palpitations or peripheral edema  GI: NEGATIVE for nausea, abdominal pain, heartburn, or change in bowel habits  : NEGATIVE for unusual urinary or vaginal symptoms. Periods are regular.  MUSCULOSKELETAL: NEGATIVE for significant arthralgias or myalgia  NEURO: NEGATIVE for weakness, dizziness or paresthesias  ENDOCRINE: NEGATIVE for temperature intolerance, skin/hair changes  HEME/ALLERGY/IMMUNE: NEGATIVE for bleeding problems  PSYCHIATRIC: NEGATIVE for changes in mood or affect    OBJECTIVE:   /70   Pulse 64   Temp 96.7  F (35.9  C) (Tympanic)   Ht 1.56 m (5' 1.42\")   Wt 65.3 kg (144 lb)   LMP 06/20/2017 (Approximate)   SpO2 98%   BMI 26.84 kg/m    EXAM:  GENERAL: healthy, alert and no distress  EYES: Eyes grossly normal to inspection, PERRL and conjunctivae and sclerae normal  HENT: ear canals and TM's normal, nose and mouth without ulcers or lesions  NECK: no adenopathy, no asymmetry, masses, or scars and thyroid normal to palpation  RESP: lungs clear to auscultation - no rales, rhonchi or wheezes  BREAST: normal without masses, tenderness or nipple discharge and no palpable axillary masses or adenopathy  CV: regular rate and rhythm, normal S1 S2, no S3 or S4, no murmur, click or rub, no peripheral edema and peripheral pulses strong  ABDOMEN: soft, nontender, " no hepatosplenomegaly, no masses and bowel sounds normal   (female): normal female external genitalia, normal urethral meatus, vaginal mucosa pink, moist, well rugated, and normal cervix/adnexa/uterus without masses or discharge  RECTAL: deferred  MS: no gross musculoskeletal defects noted, no edema  SKIN: no suspicious lesions or rashes  NEURO: Normal strength and tone, mentation intact and speech normal  PSYCH: mentation appears normal, affect normal/bright        ASSESSMENT/PLAN:           (Z00.00) Routine general medical examination at a health care facility  (primary encounter diagnosis)  Comment:   Plan: Lipid panel reflex to direct LDL Fasting,         Glucose            (Z11.4) Screening for HIV (human immunodeficiency virus)  Comment:   Plan: HIV Antigen Antibody Combo            (Z11.59) Need for hepatitis C screening test  Comment:   Plan: Hepatitis C Screen Reflex to HCV RNA Quant and         Genotype            (Z13.220) Screening for hyperlipidemia  Comment:   Plan: Lipid panel reflex to direct LDL Fasting            (Z12.4) Screening for malignant neoplasm of cervix  Comment:   Plan: Pap imaged thin layer screen with HPV -         recommended age 30 - 65 years (select HPV order        below), HPV High Risk Types DNA Cervical            (E55.9) Vitamin D deficiency  Comment:   Plan: Vitamin D Deficiency            (Z12.31) Breast cancer screening by mammogram  Comment:   Plan: MA SCREENING DIGITAL BILAT - Future  (s+30)            (Z78.0) Menopause present  Comment:   Plan:     (Z12.11) Colon cancer screening  Comment:   Plan: GASTROENTEROLOGY ADULT REF PROCEDURE ONLY            (R68.89) Cold intolerance  Comment:   Plan: TSH with free T4 reflex, CBC with platelets        Has hx of low vit-d and hbf also fam hx of hypothyroid , so wish to proceed with labs f/u as needed           Check labs. refill sent.Cares and  treatment discussed follow. up if problem   Patient expressed understanding and  "agreement with treatment plan. All patient's questions were answered, will let me know if has more later.  Medications: Rx's: Reviewed the potential side effects/complications of medications prescribed.       COUNSELING:   Reviewed preventive health counseling, as reflected in patient instructions       Regular exercise       Healthy diet/nutrition       Vision screening       Hearing screening       Immunizations    scheduled to get Vaccinated for: covid  So and Declined: TDAP and Zoster ,  will do later                Osteoporosis prevention/bone health       Colon cancer screening       Consider Hep C screening for all patients one time for ages 18-79 years       HIV screeninx in teen years, 1x in adult years, and at intervals if high risk    Estimated body mass index is 26.84 kg/m  as calculated from the following:    Height as of this encounter: 1.56 m (5' 1.42\").    Weight as of this encounter: 65.3 kg (144 lb).    Weight management plan: Discussed healthy diet and exercise guidelines    She reports that she has never smoked. She has never used smokeless tobacco.      Counseling Resources:  ATP IV Guidelines  Pooled Cohorts Equation Calculator  Breast Cancer Risk Calculator  BRCA-Related Cancer Risk Assessment: FHS-7 Tool  FRAX Risk Assessment  ICSI Preventive Guidelines  Dietary Guidelines for Americans, 2010  USDA's MyPlate  ASA Prophylaxis  Lung CA Screening    June Prieto MD  [facility]  "

## 2021-04-09 LAB
COPATH REPORT: NORMAL
PAP: NORMAL

## 2021-04-09 NOTE — TELEPHONE ENCOUNTER
HPI  Xochilt Peoples is a 33 y.o. female telephone follow-up to continue current medications.  Denies any new complaints.  Has been trying to what is diet and especially avoiding sweets because of tendency towards diabetes.  Current medications reviewed and will contact pharmacy when he needs renewal.  Is getting appointment from dermatologist.  Does take occasional Atarax for itching.  Most recent lab last July reviewed and do recommend making an appointment in July for annual recheck.  Does remain on sertraline 125 mg daily.      Review of Systems   All other systems reviewed and are negative.        Past Medical History:   Diagnosis Date   • Acute frontal sinusitis    • Acute maxillary sinusitis    • CVA (cerebral vascular accident) (CMS/HCC)    • Elevated cholesterol    • Factor 5 Leiden mutation, heterozygous (CMS/HCC)    • Internal carotid artery dissection (CMS/HCC)    • Left facial swelling    • Obstructive sleep apnea    • Ovarian cyst    • Panic attack    • Stroke (CMS/HCC)    • Stroke (CMS/HCC)     TIA   • Stroke (CMS/HCC) 2015       No past surgical history on file.    Family History   Problem Relation Age of Onset   • Anxiety disorder Mother    • Deep vein thrombosis Mother    • Diabetes Mother    • Anxiety disorder Father    • Hypertension Father    • Other Father         renal disease   • Diabetes Father    • Other Other         acute myocardial infarction, cardiac disorder, emphysema   • Lung cancer Other    • Osteoporosis Maternal Grandmother    • Diabetes Paternal Grandmother    • Diabetes Paternal Grandfather    • Breast cancer Neg Hx    • Ovarian cancer Neg Hx    • Uterine cancer Neg Hx    • Colon cancer Neg Hx    • Pulmonary embolism Neg Hx        Social History     Socioeconomic History   • Marital status:      Spouse name: MORENITA   • Number of children: 1   • Years of education: COLLEGE   • Highest education level: Not on file   Tobacco Use   • Smoking status: Never Smoker   • Smokeless  [employer] Screen received and placed on MA desk for completion then give to PCP to sign  Last PX was 7/13/17  Nicole Cevallos TC   tobacco: Never Used   Substance and Sexual Activity   • Alcohol use: No   • Drug use: No   • Sexual activity: Yes     Partners: Male     Birth control/protection: None     Comment: vasectomy        Vitals:    04/09/21 0822   BP: 134/82   Pulse: 87   SpO2: 98%        There is no height or weight on file to calculate BMI.      Physical Exam  Vitals reviewed.   Constitutional:       General: She is not in acute distress.  Pulmonary:      Effort: Pulmonary effort is normal. No respiratory distress.   Neurological:      Mental Status: She is alert and oriented to person, place, and time.   Psychiatric:         Mood and Affect: Mood normal.         Thought Content: Thought content normal.         Judgment: Judgment normal.           Assessment/Plan    Diagnoses and all orders for this visit:    1. Mixed anxiety depressive disorder (Primary)    2. Hyperlipidemia, unspecified hyperlipidemia type    3. Family history of diabetes mellitus    4. Gastroesophageal reflux disease without esophagitis      Telephone visit for follow-up and renewal of medications.  Overall seems to be doing extremely well on current regimen which will be continued.  Emotionally seems to be doing well on current dose of sertraline.  Discussed diet and exercise for diabetes prevention.  Do recommend appointment in July for annual lab work and especially to recheck A1c.  Also do recommend getting Covid vaccine, potential benefits far outweigh any risks.    This note includes information entered using a voice recognition dictation system.  Though reviewed, some nonsensible errors may remain.    This visit has been rescheduled as a phone visit to comply with patient safety concerns in accordance with CDC recommendations. Total time of discussion was 14 minutes.

## 2021-04-12 LAB
FINAL DIAGNOSIS: NORMAL
HPV HR 12 DNA CVX QL NAA+PROBE: NEGATIVE
HPV16 DNA SPEC QL NAA+PROBE: NEGATIVE
HPV18 DNA SPEC QL NAA+PROBE: NEGATIVE
SPECIMEN DESCRIPTION: NORMAL
SPECIMEN SOURCE CVX/VAG CYTO: NORMAL

## 2021-05-21 ENCOUNTER — TELEPHONE (OUTPATIENT)
Dept: FAMILY MEDICINE | Facility: CLINIC | Age: 51
End: 2021-05-21

## 2021-06-01 ENCOUNTER — HOSPITAL ENCOUNTER (OUTPATIENT)
Dept: MAMMOGRAPHY | Facility: CLINIC | Age: 51
Discharge: HOME | End: 2021-06-01
Attending: FAMILY MEDICINE | Admitting: FAMILY MEDICINE
Payer: COMMERCIAL

## 2021-06-01 DIAGNOSIS — Z12.31 BREAST CANCER SCREENING BY MAMMOGRAM: ICD-10-CM

## 2021-06-01 PROCEDURE — 77067 SCR MAMMO BI INCL CAD: CPT

## 2021-09-18 ENCOUNTER — HEALTH MAINTENANCE LETTER (OUTPATIENT)
Age: 51
End: 2021-09-18

## 2022-06-25 ENCOUNTER — HEALTH MAINTENANCE LETTER (OUTPATIENT)
Age: 52
End: 2022-06-25

## 2022-07-11 ENCOUNTER — TELEPHONE (OUTPATIENT)
Dept: FAMILY MEDICINE | Facility: CLINIC | Age: 52
End: 2022-07-11

## 2022-07-11 NOTE — TELEPHONE ENCOUNTER
She needs to be seen, although if symptoms are not bothersome and her appointment for physical is  not too far then she can wait till physical     She is past due for physical anyways.  Last one was April 2021'

## 2022-07-11 NOTE — TELEPHONE ENCOUNTER
Pt called states she has had a very small amount of blood when wiping with tissue - unsure if vaginal or urethral bleeding     Not seeing any blood in toilet     Sees blood with wiping, unsure if vaginal or urinary - is years post-menopausal     No urinary symptoms     Was scratching/itching vaginal/urethral area recently - scratched at night, noticed blood with wiping the next AM     Had very little blood on tissue     No clots and pee is clear, seeing just with wiping     Blood on underwear? No denies     Pt has physical in August. Asking if she needs needs to be seen prior to physical for this? Or should she wait to see if bleeding goes away as it happened after she scratched/itched her vaginal/urethral area?     Tamiko CORDOVA Triage RN  [facility]       Appointments in Next Year    Aug 11, 2022  9:00 AM  (Arrive by 8:40 AM)  Adult Preventative Visit with June Prieto MD  [facility] [place] ([facility] ) [phone]

## 2022-07-15 NOTE — TELEPHONE ENCOUNTER
Patient Contact     Attempted to call pt and left detailed vm with message from Dr. Prieto, see below. Also left clinic number for any further questions or concerns.     Sachi HUNTLEY RN  [facility]

## 2022-08-20 ENCOUNTER — HEALTH MAINTENANCE LETTER (OUTPATIENT)
Age: 52
End: 2022-08-20

## 2022-10-17 ENCOUNTER — OFFICE VISIT (OUTPATIENT)
Dept: FAMILY MEDICINE | Facility: CLINIC | Age: 52
End: 2022-10-17
Payer: COMMERCIAL

## 2022-10-17 VITALS
WEIGHT: 149 LBS | OXYGEN SATURATION: 99 % | SYSTOLIC BLOOD PRESSURE: 103 MMHG | TEMPERATURE: 97.9 F | BODY MASS INDEX: 27.77 KG/M2 | HEART RATE: 67 BPM | DIASTOLIC BLOOD PRESSURE: 70 MMHG

## 2022-10-17 DIAGNOSIS — Z13.220 SCREENING FOR HYPERLIPIDEMIA: ICD-10-CM

## 2022-10-17 DIAGNOSIS — Z12.11 SCREEN FOR COLON CANCER: ICD-10-CM

## 2022-10-17 DIAGNOSIS — Z12.31 VISIT FOR SCREENING MAMMOGRAM: ICD-10-CM

## 2022-10-17 DIAGNOSIS — L67.8 DRY HAIR: ICD-10-CM

## 2022-10-17 DIAGNOSIS — Z00.00 ENCOUNTER FOR ROUTINE ADULT HEALTH EXAMINATION WITHOUT ABNORMAL FINDINGS: Primary | ICD-10-CM

## 2022-10-17 DIAGNOSIS — E78.5 HYPERLIPIDEMIA LDL GOAL <130: ICD-10-CM

## 2022-10-17 LAB
ANION GAP SERPL CALCULATED.3IONS-SCNC: 5 MMOL/L (ref 3–14)
BASOPHILS # BLD AUTO: 0 10E3/UL (ref 0–0.2)
BASOPHILS NFR BLD AUTO: 0 %
BUN SERPL-MCNC: 10 MG/DL (ref 7–30)
CALCIUM SERPL-MCNC: 9.4 MG/DL (ref 8.5–10.1)
CHLORIDE BLD-SCNC: 107 MMOL/L (ref 94–109)
CHOLEST SERPL-MCNC: 193 MG/DL
CO2 SERPL-SCNC: 26 MMOL/L (ref 20–32)
CREAT SERPL-MCNC: 0.65 MG/DL (ref 0.52–1.04)
EOSINOPHIL # BLD AUTO: 0.2 10E3/UL (ref 0–0.7)
EOSINOPHIL NFR BLD AUTO: 4 %
ERYTHROCYTE [DISTWIDTH] IN BLOOD BY AUTOMATED COUNT: 13.4 % (ref 10–15)
FASTING STATUS PATIENT QL REPORTED: YES
FERRITIN SERPL-MCNC: 77 NG/ML (ref 8–252)
GFR SERPL CREATININE-BSD FRML MDRD: >90 ML/MIN/1.73M2
GLUCOSE BLD-MCNC: 98 MG/DL (ref 70–99)
HCT VFR BLD AUTO: 39.3 % (ref 35–47)
HDLC SERPL-MCNC: 57 MG/DL
HGB BLD-MCNC: 13.2 G/DL (ref 11.7–15.7)
IRON SATN MFR SERPL: 26 % (ref 15–46)
IRON SERPL-MCNC: 89 UG/DL (ref 35–180)
LDLC SERPL CALC-MCNC: 109 MG/DL
LYMPHOCYTES # BLD AUTO: 1.5 10E3/UL (ref 0.8–5.3)
LYMPHOCYTES NFR BLD AUTO: 33 %
MCH RBC QN AUTO: 28.3 PG (ref 26.5–33)
MCHC RBC AUTO-ENTMCNC: 33.6 G/DL (ref 31.5–36.5)
MCV RBC AUTO: 84 FL (ref 78–100)
MONOCYTES # BLD AUTO: 0.3 10E3/UL (ref 0–1.3)
MONOCYTES NFR BLD AUTO: 7 %
NEUTROPHILS # BLD AUTO: 2.6 10E3/UL (ref 1.6–8.3)
NEUTROPHILS NFR BLD AUTO: 57 %
NONHDLC SERPL-MCNC: 136 MG/DL
PLATELET # BLD AUTO: 182 10E3/UL (ref 150–450)
POTASSIUM BLD-SCNC: 4 MMOL/L (ref 3.4–5.3)
RBC # BLD AUTO: 4.67 10E6/UL (ref 3.8–5.2)
SODIUM SERPL-SCNC: 138 MMOL/L (ref 133–144)
TIBC SERPL-MCNC: 345 UG/DL (ref 240–430)
TRIGL SERPL-MCNC: 137 MG/DL
TSH SERPL DL<=0.005 MIU/L-ACNC: 1.61 MU/L (ref 0.4–4)
VIT B12 SERPL-MCNC: 262 PG/ML (ref 232–1245)
WBC # BLD AUTO: 4.6 10E3/UL (ref 4–11)

## 2022-10-17 PROCEDURE — 99213 OFFICE O/P EST LOW 20 MIN: CPT | Mod: 25 | Performed by: PHYSICIAN ASSISTANT

## 2022-10-17 PROCEDURE — 85025 COMPLETE CBC W/AUTO DIFF WBC: CPT | Performed by: PHYSICIAN ASSISTANT

## 2022-10-17 PROCEDURE — 82728 ASSAY OF FERRITIN: CPT | Performed by: PHYSICIAN ASSISTANT

## 2022-10-17 PROCEDURE — 82306 VITAMIN D 25 HYDROXY: CPT | Performed by: PHYSICIAN ASSISTANT

## 2022-10-17 PROCEDURE — 99396 PREV VISIT EST AGE 40-64: CPT | Performed by: PHYSICIAN ASSISTANT

## 2022-10-17 PROCEDURE — 83550 IRON BINDING TEST: CPT | Performed by: PHYSICIAN ASSISTANT

## 2022-10-17 PROCEDURE — 80048 BASIC METABOLIC PNL TOTAL CA: CPT | Performed by: PHYSICIAN ASSISTANT

## 2022-10-17 PROCEDURE — 82607 VITAMIN B-12: CPT | Performed by: PHYSICIAN ASSISTANT

## 2022-10-17 PROCEDURE — 83540 ASSAY OF IRON: CPT | Performed by: PHYSICIAN ASSISTANT

## 2022-10-17 PROCEDURE — 80061 LIPID PANEL: CPT | Performed by: PHYSICIAN ASSISTANT

## 2022-10-17 PROCEDURE — 84443 ASSAY THYROID STIM HORMONE: CPT | Performed by: PHYSICIAN ASSISTANT

## 2022-10-17 PROCEDURE — 36415 COLL VENOUS BLD VENIPUNCTURE: CPT | Performed by: PHYSICIAN ASSISTANT

## 2022-10-17 ASSESSMENT — ENCOUNTER SYMPTOMS
DYSURIA: 0
NAUSEA: 0
PALPITATIONS: 0
CHILLS: 0
HEMATOCHEZIA: 0
PARESTHESIAS: 0
FREQUENCY: 0
FEVER: 0
ARTHRALGIAS: 0
SORE THROAT: 0
EYE PAIN: 0
JOINT SWELLING: 0
HEADACHES: 0
HEMATURIA: 0
CONSTIPATION: 0
NERVOUS/ANXIOUS: 0
WEAKNESS: 0
MYALGIAS: 0
DIZZINESS: 0
COUGH: 0
HEARTBURN: 0
BREAST MASS: 0
DIARRHEA: 0
ABDOMINAL PAIN: 0
SHORTNESS OF BREATH: 0

## 2022-10-17 NOTE — PROGRESS NOTES
SUBJECTIVE:   CC: Giuliana is an 52 year old who presents for preventive health visit.     Patient has been advised of split billing requirements and indicates understanding: Yes  Healthy Habits:     Getting at least 3 servings of Calcium per day:  NO    Bi-annual eye exam:  Yes    Dental care twice a year:  Yes    Sleep apnea or symptoms of sleep apnea:  Daytime drowsiness and Excessive snoring    Diet:  Regular (no restrictions) and Vegetarian/vegan    Frequency of exercise:  2-3 days/week    Duration of exercise:  15-30 minutes    Taking medications regularly:  Yes    Medication side effects:  Muscle aches    PHQ-2 Total Score: 0    Additional concerns today:  No        Today's PHQ-2 Score:   PHQ-2 ( 1999 Pfizer) 10/17/2022   Q1: Little interest or pleasure in doing things 0   Q2: Feeling down, depressed or hopeless 0   PHQ-2 Score 0   PHQ-2 Total Score (12-17 Years)- Positive if 3 or more points; Administer PHQ-A if positive -   Q1: Little interest or pleasure in doing things Not at all   Q2: Feeling down, depressed or hopeless Not at all   PHQ-2 Score 0       Abuse: Current or Past (Physical, Sexual or Emotional) - No  Do you feel safe in your environment? Yes        Social History     Tobacco Use     Smoking status: Never     Smokeless tobacco: Never   Substance Use Topics     Alcohol use: No         Alcohol Use 10/17/2022   Prescreen: >3 drinks/day or >7 drinks/week? No   Prescreen: >3 drinks/day or >7 drinks/week? -       Reviewed orders with patient.  Reviewed health maintenance and updated orders accordingly - Yes  Patient Active Problem List   Diagnosis     Dyspepsia and other specified disorders of function of stomach     Allergic rhinitis     Neck pain     Sore throat, chronic     Family history of diabetes mellitus (DM)     Esophageal reflux     Hyperlipidemia LDL goal <130     Gastroesophageal reflux disease without esophagitis     Chest wall pain     Vitamin D deficiency     Irregular menstrual  cycle     Family history of thyroid disease     Uterine leiomyoma, unspecified location     Colon cancer screening     Nasal congestion     Menopause present     Past Surgical History:   Procedure Laterality Date     LAPAROSCOPIC CHOLECYSTECTOMY      Cholecystectomy, Laparoscopic     ZZC  DELIVERY ONLY      , Low Cervical       Social History     Tobacco Use     Smoking status: Never     Smokeless tobacco: Never   Substance Use Topics     Alcohol use: No     Family History   Problem Relation Age of Onset     Diabetes Mother      Hypertension Mother      Diabetes Father      Hypertension Father      C.A.D. Father          of MI at age of 70     Breast Cancer Maternal Grandmother         at age 65+, unsure ?     Breast Cancer Paternal Grandmother         55+     Cancer - colorectal No family hx of          Current Outpatient Medications   Medication Sig Dispense Refill     Vitamin D3 (CHOLECALCIFEROL) 125 MCG (5000 UT) tablet        Allergies   Allergen Reactions     No Known Allergies        Breast Cancer Screening:    FHS-7:   Breast CA Risk Assessment (FHS-7) 10/17/2022   Did any of your first-degree relatives have breast or ovarian cancer? No   Did any of your relatives have bilateral breast cancer? No   Did any man in your family have breast cancer? No   Did any woman in your family have breast and ovarian cancer? Yes   Did any woman in your family have breast cancer before age 50 y? Unknown   Do you have 2 or more relatives with breast and/or ovarian cancer? Unknown   Do you have 2 or more relatives with breast and/or bowel cancer? Unknown       Mammogram Screening: Recommended annual mammography  Pertinent mammograms are reviewed under the imaging tab.    History of abnormal Pap smear: NO - age 30-65 PAP every 5 years with negative HPV co-testing recommended  PAP / HPV Latest Ref Rng & Units 2021   PAP (Historical) - NIL NIL NIL   HPV16 NEG:Negative Negative  Negative -   HPV18 NEG:Negative Negative Negative -   HRHPV NEG:Negative Negative Negative -     Reviewed and updated as needed this visit by clinical staff   Tobacco  Allergies  Meds  Problems  Med Hx  Surg Hx  Fam Hx  Soc   Hx        Reviewed and updated as needed this visit by Provider   Tobacco    Problems  Med Hx  Surg Hx  Fam Hx         Past Medical History:   Diagnosis Date     Dyspepsia and other specified disorders of function of stomach     pos H.pylori tx'ed     Vitamin D deficiencies       Past Surgical History:   Procedure Laterality Date     LAPAROSCOPIC CHOLECYSTECTOMY      Cholecystectomy, Laparoscopic     ZZC  DELIVERY ONLY      , Low Cervical     OB History   No obstetric history on file.       Review of Systems   Constitutional: Negative for chills and fever.   HENT: Negative for congestion, ear pain, hearing loss and sore throat.    Eyes: Negative for pain and visual disturbance.   Respiratory: Negative for cough and shortness of breath.    Cardiovascular: Negative for chest pain, palpitations and peripheral edema.   Gastrointestinal: Negative for abdominal pain, constipation, diarrhea, heartburn, hematochezia and nausea.   Breasts:  Negative for tenderness, breast mass and discharge.   Genitourinary: Negative for dysuria, frequency, genital sores, hematuria, pelvic pain, urgency, vaginal bleeding and vaginal discharge.   Musculoskeletal: Negative for arthralgias, joint swelling and myalgias.   Skin: Negative for rash.   Neurological: Negative for dizziness, weakness, headaches and paresthesias.   Psychiatric/Behavioral: Negative for mood changes. The patient is not nervous/anxious.           OBJECTIVE:   /70   Pulse 67   Temp 97.9  F (36.6  C) (Temporal)   Wt 67.6 kg (149 lb)   LMP 2017 (Approximate)   SpO2 99%   BMI 27.77 kg/m    Physical Exam  GENERAL: healthy, alert and no distress  EYES: Eyes grossly normal to inspection, PERRL  and conjunctivae and sclerae normal  HENT: ear canals and TM's normal, nose and mouth without ulcers or lesions  NECK: no adenopathy, no asymmetry, masses, or scars and thyroid normal to palpation  RESP: lungs clear to auscultation - no rales, rhonchi or wheezes  BREAST: normal without masses, tenderness or nipple discharge and no palpable axillary masses or adenopathy  CV: regular rate and rhythm, normal S1 S2, no S3 or S4, no murmur, click or rub, no peripheral edema and peripheral pulses strong  ABDOMEN: soft, nontender, no hepatosplenomegaly, no masses and bowel sounds normal  MS: no gross musculoskeletal defects noted, no edema  SKIN: no suspicious lesions or rashes  NEURO: Normal strength and tone, mentation intact and speech normal  PSYCH: mentation appears normal, affect normal/bright      ASSESSMENT/PLAN:       1. Encounter for routine adult health examination without abnormal findings    2. Hyperlipidemia LDL goal <130  - Lipid panel reflex to direct LDL Non-fasting; Future  - Lipid panel reflex to direct LDL Non-fasting    3. Dry hair   1 year history of dry hair with breakage noted by patient.  She is requesting labs for vitamin deficiencies.  She is also through menopause which may be contributing to hair changes.  This was discussed.  Advised that she may try prenatal vitamin or hair/skin/nails supplement, try moisturizing shampoo as well.   - Vitamin B12; Future  - Ferritin; Future  - CBC with platelets and differential; Future  - Iron and iron binding capacity; Future  - Basic metabolic panel  (Ca, Cl, CO2, Creat, Gluc, K, Na, BUN); Future  - Vitamin D Deficiency; Future  - TSH with free T4 reflex; Future  - Vitamin B12  - Ferritin  - CBC with platelets and differential  - Iron and iron binding capacity  - Basic metabolic panel  (Ca, Cl, CO2, Creat, Gluc, K, Na, BUN)  - Vitamin D Deficiency  - TSH with free T4 reflex    4. Screen for colon cancer  - Fecal colorectal cancer screen FIT - Future (S+30);  "Future  - Fecal colorectal cancer screen FIT - Future (S+30)    5. Screening for hyperlipidemia  - Lipid panel reflex to direct LDL Non-fasting; Future  - Lipid panel reflex to direct LDL Non-fasting    6. Visit for screening mammogram  - MA SCREENING DIGITAL BILAT - Future  (s+30); Future        COUNSELING:  Reviewed preventive health counseling, as reflected in patient instructions       Regular exercise       Healthy diet/nutrition    Estimated body mass index is 27.77 kg/m  as calculated from the following:    Height as of 4/7/21: 1.56 m (5' 1.42\").    Weight as of this encounter: 67.6 kg (149 lb).        She reports that she has never smoked. She has never used smokeless tobacco.      Counseling Resources:  ATP IV Guidelines  Pooled Cohorts Equation Calculator  Breast Cancer Risk Calculator  BRCA-Related Cancer Risk Assessment: FHS-7 Tool  FRAX Risk Assessment  ICSI Preventive Guidelines  Dietary Guidelines for Americans, 2010  USDA's MyPlate  ASA Prophylaxis  Lung CA Screening    MISAEL Johns [facility]  "

## 2022-10-18 LAB — DEPRECATED CALCIDIOL+CALCIFEROL SERPL-MC: 23 UG/L (ref 20–75)

## 2022-10-18 PROCEDURE — 82274 ASSAY TEST FOR BLOOD FECAL: CPT | Performed by: PHYSICIAN ASSISTANT

## 2022-10-20 NOTE — RESULT ENCOUNTER NOTE
Giuliana-  Here are your recent results.     Your labs look normal with the exception of slightly elevated LDL cholesterol.  Eating a healthy diet and exercising regularly can help to improve this.    Your may continue to take your vitamin D supplement    Ranjith Torres PA-C

## 2022-10-21 LAB — HEMOCCULT STL QL IA: NEGATIVE

## 2022-10-24 NOTE — RESULT ENCOUNTER NOTE
Giuliana-  Here are your recent results.     -FIT test (screening test for colon cancer) was normal. ADVISE: rechecking this test in 1 year.    If you have any questions please do not hesitate to contact our office via phone ([phone]) or you may send me a message via [employer] by clicking the contact my Care Team link.        Thank you,    Ranjith Torres MPH, PA-C  [address] [place] [address] [phone]

## 2022-11-20 ENCOUNTER — HEALTH MAINTENANCE LETTER (OUTPATIENT)
Age: 52
End: 2022-11-20

## 2023-09-16 ENCOUNTER — HEALTH MAINTENANCE LETTER (OUTPATIENT)
Age: 53
End: 2023-09-16

## 2023-11-25 ENCOUNTER — HEALTH MAINTENANCE LETTER (OUTPATIENT)
Age: 53
End: 2023-11-25

## 2024-04-01 ENCOUNTER — OFFICE VISIT (OUTPATIENT)
Dept: FAMILY MEDICINE | Facility: CLINIC | Age: 54
End: 2024-04-01
Payer: COMMERCIAL

## 2024-04-01 ENCOUNTER — ORDERS ONLY (AUTO-RELEASED) (OUTPATIENT)
Dept: FAMILY MEDICINE | Facility: CLINIC | Age: 54
End: 2024-04-01

## 2024-04-01 VITALS
WEIGHT: 152 LBS | RESPIRATION RATE: 16 BRPM | SYSTOLIC BLOOD PRESSURE: 118 MMHG | OXYGEN SATURATION: 96 % | TEMPERATURE: 97.3 F | DIASTOLIC BLOOD PRESSURE: 79 MMHG | HEIGHT: 62 IN | HEART RATE: 85 BPM | BODY MASS INDEX: 27.97 KG/M2

## 2024-04-01 DIAGNOSIS — Z12.11 SCREEN FOR COLON CANCER: ICD-10-CM

## 2024-04-01 DIAGNOSIS — R73.01 IFG (IMPAIRED FASTING GLUCOSE): ICD-10-CM

## 2024-04-01 DIAGNOSIS — Z12.31 VISIT FOR SCREENING MAMMOGRAM: ICD-10-CM

## 2024-04-01 DIAGNOSIS — Z00.00 ROUTINE GENERAL MEDICAL EXAMINATION AT A HEALTH CARE FACILITY: Primary | ICD-10-CM

## 2024-04-01 DIAGNOSIS — E78.5 HYPERLIPIDEMIA LDL GOAL <130: ICD-10-CM

## 2024-04-01 LAB
ALBUMIN SERPL BCG-MCNC: 4.5 G/DL (ref 3.5–5.2)
ALP SERPL-CCNC: 87 U/L (ref 40–150)
ALT SERPL W P-5'-P-CCNC: 37 U/L (ref 0–50)
ANION GAP SERPL CALCULATED.3IONS-SCNC: 10 MMOL/L (ref 7–15)
AST SERPL W P-5'-P-CCNC: 32 U/L (ref 0–45)
BILIRUB SERPL-MCNC: 0.3 MG/DL
BUN SERPL-MCNC: 11.2 MG/DL (ref 6–20)
CALCIUM SERPL-MCNC: 9.5 MG/DL (ref 8.6–10)
CHLORIDE SERPL-SCNC: 104 MMOL/L (ref 98–107)
CHOLEST SERPL-MCNC: 211 MG/DL
CREAT SERPL-MCNC: 0.74 MG/DL (ref 0.51–0.95)
DEPRECATED HCO3 PLAS-SCNC: 24 MMOL/L (ref 22–29)
EGFRCR SERPLBLD CKD-EPI 2021: >90 ML/MIN/1.73M2
ERYTHROCYTE [DISTWIDTH] IN BLOOD BY AUTOMATED COUNT: 12.5 % (ref 10–15)
FASTING STATUS PATIENT QL REPORTED: YES
GLUCOSE SERPL-MCNC: 100 MG/DL (ref 70–99)
HBA1C MFR BLD: 5.6 % (ref 0–5.6)
HCT VFR BLD AUTO: 39.2 % (ref 35–47)
HDLC SERPL-MCNC: 53 MG/DL
HGB BLD-MCNC: 13.3 G/DL (ref 11.7–15.7)
LDLC SERPL CALC-MCNC: 132 MG/DL
MCH RBC QN AUTO: 28.1 PG (ref 26.5–33)
MCHC RBC AUTO-ENTMCNC: 33.9 G/DL (ref 31.5–36.5)
MCV RBC AUTO: 83 FL (ref 78–100)
NONHDLC SERPL-MCNC: 158 MG/DL
PLATELET # BLD AUTO: 180 10E3/UL (ref 150–450)
POTASSIUM SERPL-SCNC: 4.2 MMOL/L (ref 3.4–5.3)
PROT SERPL-MCNC: 7.5 G/DL (ref 6.4–8.3)
RBC # BLD AUTO: 4.73 10E6/UL (ref 3.8–5.2)
SODIUM SERPL-SCNC: 138 MMOL/L (ref 135–145)
TRIGL SERPL-MCNC: 130 MG/DL
WBC # BLD AUTO: 4.9 10E3/UL (ref 4–11)

## 2024-04-01 PROCEDURE — 80061 LIPID PANEL: CPT | Performed by: PHYSICIAN ASSISTANT

## 2024-04-01 PROCEDURE — 36415 COLL VENOUS BLD VENIPUNCTURE: CPT | Performed by: PHYSICIAN ASSISTANT

## 2024-04-01 PROCEDURE — 85027 COMPLETE CBC AUTOMATED: CPT | Performed by: PHYSICIAN ASSISTANT

## 2024-04-01 PROCEDURE — 99396 PREV VISIT EST AGE 40-64: CPT | Performed by: PHYSICIAN ASSISTANT

## 2024-04-01 PROCEDURE — 83036 HEMOGLOBIN GLYCOSYLATED A1C: CPT | Performed by: PHYSICIAN ASSISTANT

## 2024-04-01 PROCEDURE — 80053 COMPREHEN METABOLIC PANEL: CPT | Performed by: PHYSICIAN ASSISTANT

## 2024-04-01 RX ORDER — CHLORAL HYDRATE 500 MG
2 CAPSULE ORAL DAILY
COMMUNITY

## 2024-04-01 SDOH — HEALTH STABILITY: PHYSICAL HEALTH: ON AVERAGE, HOW MANY MINUTES DO YOU ENGAGE IN EXERCISE AT THIS LEVEL?: 30 MIN

## 2024-04-01 SDOH — HEALTH STABILITY: PHYSICAL HEALTH: ON AVERAGE, HOW MANY DAYS PER WEEK DO YOU ENGAGE IN MODERATE TO STRENUOUS EXERCISE (LIKE A BRISK WALK)?: 3 DAYS

## 2024-04-01 ASSESSMENT — PAIN SCALES - GENERAL: PAINLEVEL: NO PAIN (0)

## 2024-04-01 ASSESSMENT — SOCIAL DETERMINANTS OF HEALTH (SDOH): HOW OFTEN DO YOU GET TOGETHER WITH FRIENDS OR RELATIVES?: ONCE A WEEK

## 2024-04-01 NOTE — RESULT ENCOUNTER NOTE
Giuliana,    I have reviewed your lab results below:    - electrolytes, kidney and liver function normal   - fasting blood sugar is just 1 point elevated, but A1c (3 month average of blood sugars) is NORMAL, you do not have diabetes  - your cholesterol has increased slightly compared to last year, not to the point of needing medications but I would suggest working on adhering to a heart healthy diet and getting regular exercise. We should recheck this yearly  - blood counts are normal - normal hemoglobin/red blood cells (no anemia), normal white blood cells (infection fighting cells), normal platelets (affect ability to clot normally)      Please let me know if you have any further questions.    Take care,  Jeanette Fatima PA-C on 4/1/2024 at 4:15 PM

## 2024-04-01 NOTE — PATIENT INSTRUCTIONS
Consider shingles and tetanus (Tdap) vaccines     Preventive Care Advice   This is general advice given by our system to help you stay healthy. However, your care team may have specific advice just for you. Please talk to your care team about your preventive care needs.  Nutrition  Eat 5 or more servings of fruits and vegetables each day.  Try wheat bread, brown rice and whole grain pasta (instead of white bread, rice, and pasta).  Get enough calcium and vitamin D. Check the label on foods and aim for 100% of the RDA (recommended daily allowance).  Lifestyle  Exercise at least 150 minutes each week   (30 minutes a day, 5 days a week).  Do muscle strengthening activities 2 days a week. These help control your weight and prevent disease.  No smoking.  Wear sunscreen to prevent skin cancer.  Have a dental exam and cleaning every 6 months.  Yearly exams  See your health care team every year to talk about:  Any changes in your health.  Any medicines your care team has prescribed.  Preventive care, family planning, and ways to prevent chronic diseases.  Shots (vaccines)   HPV shots (up to age 26), if you've never had them before.  Hepatitis B shots (up to age 59), if you've never had them before.  COVID-19 shot: Get this shot when it's due.  Flu shot: Get a flu shot every year.  Tetanus shot: Get a tetanus shot every 10 years.  Pneumococcal, hepatitis A, and RSV shots: Ask your care team if you need these based on your risk.  Shingles shot (for age 50 and up).  General health tests  Diabetes screening:  Starting at age 35, Get screened for diabetes at least every 3 years.  If you are younger than age 35, ask your care team if you should be screened for diabetes.  Cholesterol test: At age 39, start having a cholesterol test every 5 years, or more often if advised.  Bone density scan (DEXA): At age 50, ask your care team if you should have this scan for osteoporosis (brittle bones).  Hepatitis C: Get tested at least once in  your life.  STIs (sexually transmitted infections)  Before age 24: Ask your care team if you should be screened for STIs.  After age 24: Get screened for STIs if you're at risk. You are at risk for STIs (including HIV) if:  You are sexually active with more than one person.  You don't use condoms every time.  You or a partner was diagnosed with a sexually transmitted infection.  If you are at risk for HIV, ask about PrEP medicine to prevent HIV.  Get tested for HIV at least once in your life, whether you are at risk for HIV or not.  Cancer screening tests  Cervical cancer screening: If you have a cervix, begin getting regular cervical cancer screening tests at age 21. Most people who have regular screenings with normal results can stop after age 65. Talk about this with your provider.  Breast cancer scan (mammogram): If you've ever had breasts, begin having regular mammograms starting at age 40. This is a scan to check for breast cancer.  Colon cancer screening: It is important to start screening for colon cancer at age 45.  Have a colonoscopy test every 10 years (or more often if you're at risk) Or, ask your provider about stool tests like a FIT test every year or Cologuard test every 3 years.  To learn more about your testing options, visit: https://www.[employer]/220963.pdf.  For help making a decision, visit: https://bit.ly/qs64825.  Prostate cancer screening test: If you have a prostate and are age 55 to 69, ask your provider if you would benefit from a yearly prostate cancer screening test.  Lung cancer screening: If you are a current or former smoker age 50 to 80, ask your care team if ongoing lung cancer screenings are right for you.  For informational purposes only. Not to replace the advice of your health care provider. Copyright   2023 [employer]. All rights reserved. Clinically reviewed by the [facility]. [employer] [id number] - REV 01/24.    Learning About  Stress  What is stress?     Stress is your body's response to a hard situation. Your body can have a physical, emotional, or mental response. Stress is a fact of life for most people, and it affects everyone differently. What causes stress for you may not be stressful for someone else.  A lot of things can cause stress. You may feel stress when you go on a job interview, take a test, or run a race. This kind of short-term stress is normal and even useful. It can help you if you need to work hard or react quickly. For example, stress can help you finish an important job on time.  Long-term stress is caused by ongoing stressful situations or events. Examples of long-term stress include long-term health problems, ongoing problems at work, or conflicts in your family. Long-term stress can harm your health.  How does stress affect your health?  When you are stressed, your body responds as though you are in danger. It makes hormones that speed up your heart, make you breathe faster, and give you a burst of energy. This is called the fight-or-flight stress response. If the stress is over quickly, your body goes back to normal and no harm is done.  But if stress happens too often or lasts too long, it can have bad effects. Long-term stress can make you more likely to get sick, and it can make symptoms of some diseases worse. If you tense up when you are stressed, you may develop neck, shoulder, or low back pain. Stress is linked to high blood pressure and heart disease.  Stress also harms your emotional health. It can make you ferrer, tense, or depressed. Your relationships may suffer, and you may not do well at work or school.  What can you do to manage stress?  You can try these things to help manage stress:   Do something active. Exercise or activity can help reduce stress. Walking is a great way to get started. Even everyday activities such as housecleaning or yard work can help.  Try yoga or liban chi. These techniques  combine exercise and meditation. You may need some training at first to learn them.  Do something you enjoy. For example, listen to music or go to a movie. Practice your hobby or do volunteer work.  Meditate. This can help you relax, because you are not worrying about what happened before or what may happen in the future.  Do guided imagery. Imagine yourself in any setting that helps you feel calm. You can use online videos, books, or a teacher to guide you.  Do breathing exercises. For example:  From a standing position, bend forward from the waist with your knees slightly bent. Let your arms dangle close to the floor.  Breathe in slowly and deeply as you return to a standing position. Roll up slowly and lift your head last.  Hold your breath for just a few seconds in the standing position.  Breathe out slowly and bend forward from the waist.  Let your feelings out. Talk, laugh, cry, and express anger when you need to. Talking with supportive friends or family, a counselor, or a lisbeth leader about your feelings is a healthy way to relieve stress. Avoid discussing your feelings with people who make you feel worse.  Write. It may help to write about things that are bothering you. This helps you find out how much stress you feel and what is causing it. When you know this, you can find better ways to cope.  What can you do to prevent stress?  You might try some of these things to help prevent stress:  Manage your time. This helps you find time to do the things you want and need to do.  Get enough sleep. Your body recovers from the stresses of the day while you are sleeping.  Get support. Your family, friends, and community can make a difference in how you experience stress.  Limit your news feed. Avoid or limit time on social media or news that may make you feel stressed.  Do something active. Exercise or activity can help reduce stress. Walking is a great way to get started.  Where can you learn more?  Go to  "https://www.[employer].net/patiented  Enter N032 in the search box to learn more about \"Learning About Stress.\"  Current as of: October 24, 2023               Content Version: 14.0    8587-3463 [employer].   Care instructions adapted under license by your healthcare professional. If you have questions about a medical condition or this instruction, always ask your healthcare professional. [employer], [employer] disclaims any warranty or liability for your use of this information.      "

## 2024-04-01 NOTE — PROGRESS NOTES
"Preventive Care Visit  [facility]  Jeanette Cardenas PA-C, Physician Assistant - Medical  Apr 1, 2024    Assessment & Plan     Routine general medical examination at a health care facility  - Lipid panel reflex to direct LDL Fasting  - Comprehensive metabolic panel (BMP + Alb, Alk Phos, ALT, AST, Total. Bili, TP)  - CBC with platelets    Hyperlipidemia LDL goal <130  - Lipid panel reflex to direct LDL Fasting    Screen for colon cancer  - COLOGUARD([employer])    Visit for screening mammogram  - MA Screen Bilateral w/Malick      BMI  Estimated body mass index is 28.26 kg/m  as calculated from the following:    Height as of this encounter: 1.562 m (5' 1.5\").    Weight as of this encounter: 68.9 kg (152 lb).   Weight management plan: Discussed healthy diet and exercise guidelines    Counseling  Appropriate preventive services were discussed with this patient, including applicable screening as appropriate for fall prevention, nutrition, physical activity, Tobacco-use cessation, weight loss and cognition.  Checklist reviewing preventive services available has been given to the patient.  Reviewed patient's diet, addressing concerns and/or questions.   She is at risk for lack of exercise and has been provided with information to increase physical activity for the benefit of her well-being.   She is at risk for psychosocial distress and has been provided with information to reduce risk.       Jeanette Fatima PA-C on 4/1/2024 at 8:22 AM        Edouard Giordano is a 53 year old, presenting for the following:  Physical         No data to display                 Health Care Directive  Patient does not have a Health Care Directive or Living Will: Discussed advance care planning with patient; however, patient declined at this time.    HPI    - Pap 2021 negative cotesting  - mammogram 6/2021 negative, DUE         4/1/2024   General Health   How would you rate your overall physical health? Good "   Feel stress (tense, anxious, or unable to sleep) Only a little   (!) STRESS CONCERN      4/1/2024   Nutrition   Three or more servings of calcium each day? Yes   Diet: Regular (no restrictions)   How many servings of fruit and vegetables per day? (!) 2-3   How many sweetened beverages each day? (!) 2         4/1/2024   Exercise   Days per week of moderate/strenous exercise 3 days   Average minutes spent exercising at this level 30 min         4/1/2024   Social Factors   Frequency of gathering with friends or relatives Once a week   Worry food won't last until get money to buy more No   Food not last or not have enough money for food? No   Do you have housing?  Yes   Are you worried about losing your housing? No   Lack of transportation? No   Unable to get utilities (heat,electricity)? No          No data to display                   4/1/2024   Dental   Dentist two times every year? Yes         4/1/2024   TB Screening   Were you born outside of the [place]? Yes     Today's PHQ-2 Score:       4/1/2024     8:13 AM   PHQ-2 ( 1999 [employer])   Q1: Little interest or pleasure in doing things 0   Q2: Feeling down, depressed or hopeless 0   PHQ-2 Score 0   Q1: Little interest or pleasure in doing things Not at all   Q2: Feeling down, depressed or hopeless Not at all   PHQ-2 Score 0           4/1/2024   Substance Use   Alcohol more than 3/day or more than 7/wk Not Applicable   Do you use any other substances recreationally? No     Social History     Tobacco Use    Smoking status: Never    Smokeless tobacco: Never   Vaping Use    Vaping Use: Never used   Substance Use Topics    Alcohol use: No    Drug use: No         10/17/2022   LAST FHS-7 RESULTS   1st degree relative breast or ovarian cancer No   Any relative bilateral breast cancer No   Any male have breast cancer No   Any ONE woman have BOTH breast AND ovarian cancer Yes   Any woman with breast cancer before 50yrs Unknown   2 or more relatives with breast AND/OR ovarian cancer  "Unknown   2 or more relatives with breast AND/OR bowel cancer Unknown     Mammogram Screening - Mammogram every 1-2 years updated in Health Maintenance based on mutual decision making        2024   STI Screening   New sexual partner(s) since last STI/HIV test? No     History of abnormal Pap smear: NO - age 30-65 PAP every 5 years with negative HPV co-testing recommended        Latest Ref Rng & Units 2021     8:30 AM 2021     8:25 AM 2017     9:27 AM   PAP / HPV   PAP (Historical)   NIL     HPV 16 DNA NEG^Negative Negative   Negative    HPV 18 DNA NEG^Negative Negative   Negative    Other HR HPV NEG^Negative Negative   Negative      ASCVD Risk   The 10-year ASCVD risk score (Alisia LOUIS, et al., 2019) is: 1.6%    Values used to calculate the score:      Age: 53 years      Sex: Female      Is Non- : No      Diabetic: No      Tobacco smoker: No      Systolic Blood Pressure: 118 mmHg      Is BP treated: No      HDL Cholesterol: 57 mg/dL      Total Cholesterol: 193 mg/dL    Reviewed and updated as needed this visit by Provider   Tobacco  Allergies  Meds  Problems  Med Hx  Surg Hx  Fam Hx            Past Medical History:   Diagnosis Date    Dyspepsia and other specified disorders of function of stomach     pos H.pylori tx'ed    Vitamin D deficiencies      Past Surgical History:   Procedure Laterality Date    LAPAROSCOPIC CHOLECYSTECTOMY      Cholecystectomy, Laparoscopic    ZZC  DELIVERY ONLY      , Low Cervical         Review of Systems  Constitutional, HEENT, cardiovascular, pulmonary, GI, , musculoskeletal, neuro, skin, endocrine and psych systems are negative, except as otherwise noted.     Objective    Exam  /79 (BP Location: Right arm, Patient Position: Sitting, Cuff Size: Adult Large)   Pulse 85   Temp 97.3  F (36.3  C) (Temporal)   Resp 16   Ht 1.562 m (5' 1.5\")   Wt 68.9 kg (152 lb)   LMP 2017 (Approximate) " "  SpO2 96%   Breastfeeding No   BMI 28.26 kg/m     Estimated body mass index is 28.26 kg/m  as calculated from the following:    Height as of this encounter: 1.562 m (5' 1.5\").    Weight as of this encounter: 68.9 kg (152 lb).    Physical Exam  GENERAL: alert and no distress  EYES: Eyes grossly normal to inspection, PERRL and conjunctivae and sclerae normal  HENT: ear canals and TM's normal, nose and mouth without ulcers or lesions  NECK: no adenopathy, no asymmetry, masses, or scars  RESP: lungs clear to auscultation - no rales, rhonchi or wheezes  BREAST: normal without masses, tenderness or nipple discharge and no palpable axillary masses or adenopathy  CV: regular rate and rhythm, normal S1 S2, no S3 or S4, no murmur, click or rub, no peripheral edema  ABDOMEN: soft, nontender, no hepatosplenomegaly, no masses and bowel sounds normal  MS: no gross musculoskeletal defects noted, no edema  SKIN: no suspicious lesions or rashes  NEURO: Normal strength and tone, mentation intact and speech normal  PSYCH: mentation appears normal, affect normal/bright        Signed Electronically by: Jeanette Fatima PA-C on 4/1/2024 at 8:24 AM    "

## 2024-04-18 LAB — NONINV COLON CA DNA+OCC BLD SCRN STL QL: NEGATIVE

## 2024-08-25 ENCOUNTER — HEALTH MAINTENANCE LETTER (OUTPATIENT)
Age: 54
End: 2024-08-25

## 2024-08-28 ENCOUNTER — ANCILLARY PROCEDURE (OUTPATIENT)
Dept: MAMMOGRAPHY | Facility: CLINIC | Age: 54
End: 2024-08-28
Attending: PHYSICIAN ASSISTANT
Payer: COMMERCIAL

## 2024-08-28 DIAGNOSIS — Z12.31 VISIT FOR SCREENING MAMMOGRAM: ICD-10-CM

## 2024-08-28 PROCEDURE — 77067 SCR MAMMO BI INCL CAD: CPT | Mod: TC | Performed by: RADIOLOGY

## 2024-08-28 PROCEDURE — 77063 BREAST TOMOSYNTHESIS BI: CPT | Mod: TC | Performed by: RADIOLOGY

## 2024-08-29 ENCOUNTER — TELEPHONE (OUTPATIENT)
Dept: FAMILY MEDICINE | Facility: CLINIC | Age: 54
End: 2024-08-29
Payer: COMMERCIAL

## 2024-08-29 NOTE — TELEPHONE ENCOUNTER
Current results do not state cancer, but further imaging is needed to rule out any concerning findings.   Jeanette Fatima PA-C on 8/29/2024 at 11:56 AM     nikos

## 2024-08-29 NOTE — TELEPHONE ENCOUNTER
Mammogram results should be relayed by the breast center. It appears further imaging is needed to assess, which has been ordered and is scheduled.   Jeanette Fatima PA-C on 8/29/2024 at 11:40 AM

## 2024-08-29 NOTE — TELEPHONE ENCOUNTER
Spoke with pt and relayed providers message. Pt stated understanding and had no further questions. Pt will call insurance to make sure further imaging is covered.     Pam Smith RN

## 2024-08-29 NOTE — TELEPHONE ENCOUNTER
"Spoke with pt and relayed providers message. Pt has further questions as to what these results mean? \"Does it mean it is cancer?\"    Breast center does not go over further detailed of what results mean, they state that is for the provider to discuss with he pt. They have a radiologist on site but no provider hat would go into more detailed or answer questions for pt.    Please advise on further explanation of current results.     Pam Smith RN    "

## 2024-08-29 NOTE — TELEPHONE ENCOUNTER
CC: Patient calling requesting results of recent mammogram ordered by Jeanette Fatima PA-C.    Routing to appropriate provider to please review and advise on results - thank you!     Callback [phone] - ok to leave detailed VM     Snehal STAFFORD  [facility]

## 2024-09-04 ENCOUNTER — HOSPITAL ENCOUNTER (OUTPATIENT)
Dept: MAMMOGRAPHY | Facility: CLINIC | Age: 54
Discharge: HOME | End: 2024-09-04
Attending: PHYSICIAN ASSISTANT
Payer: COMMERCIAL

## 2024-09-04 DIAGNOSIS — R92.8 ABNORMAL MAMMOGRAM: ICD-10-CM

## 2024-09-04 PROCEDURE — 76642 ULTRASOUND BREAST LIMITED: CPT | Mod: RT

## 2024-09-04 PROCEDURE — 88360 TUMOR IMMUNOHISTOCHEM/MANUAL: CPT | Mod: 26 | Performed by: PATHOLOGY

## 2024-09-04 PROCEDURE — 88305 TISSUE EXAM BY PATHOLOGIST: CPT | Mod: 26 | Performed by: PATHOLOGY

## 2024-09-04 PROCEDURE — 250N000009 HC RX 250: Performed by: PHYSICIAN ASSISTANT

## 2024-09-04 PROCEDURE — 999N000065 MA POST PROCEDURE RIGHT

## 2024-09-04 PROCEDURE — 88342 IMHCHEM/IMCYTCHM 1ST ANTB: CPT | Mod: TC | Performed by: PHYSICIAN ASSISTANT

## 2024-09-04 PROCEDURE — 272N000615 US BREAST BIOPSY CORE NEEDLE RIGHT

## 2024-09-04 PROCEDURE — 88342 IMHCHEM/IMCYTCHM 1ST ANTB: CPT | Mod: 26 | Performed by: PATHOLOGY

## 2024-09-04 RX ADMIN — LIDOCAINE HYDROCHLORIDE 5 ML: 10 INJECTION, SOLUTION INFILTRATION; PERINEURAL at 10:22

## 2024-09-04 NOTE — DISCHARGE INSTRUCTIONS
After Your Breast Biopsy  Bleeding, bruising, and pain  Breast tenderness and some bruising is normal and may last several days. You may wear your bra overnight to support the breast.  You may use an ice pack for pain. Place it over the area for 15 to 20 minutes, several times a day.  You may take over-the-counter pain medicine:  On the day of the biopsy, we recommend Tylenol (acetaminophen) because it does not raise your risk of bleeding.  The next day, you may take an anti-inflammatory medicine (aspirin, ibuprofen, Motrin, Aleve, Advil), unless your doctor tells you not to.  Bandages and showering  Keep your bandage in place until tomorrow morning. Don't get it wet.  If you have small pieces of tape on the skin, leave them in place. They will fall off on their own, or you can remove them after 5 days.  You may shower the next morning after your biopsy.  Activity  No heavy activity (no running, no gym workouts, no lifting, no vacuuming, etc.) on the day of your biopsy.  You may go back to normal activity the next day. But limit what you do if you still have pain or discomfort.  Infection  Infection is rare. Signs of infection include:  Fever (including sweats and chills)  Redness  Pain that gets worse  Fluid draining from the biopsy site  Biopsy results  Results may take up to 5 business days.  A nurse or doctor from the Breast Center will call with your results. We will also send the results to the doctor that ordered your biopsy.  If you have not gotten your results in 5 days, please call the Breast Center.  Call the Breast Center with questions or if:   You have bleeding that lasts more than 20 minutes.  You have pain that you can't control.  You have signs of infection (fever, sweats, chills, redness, increasing pain, or drainage).  After hours, please call the doctor who ordered your biopsy.  For informational purposes only. Not to replace the advice of your health care provider. Copyright   2010 [facility]. All rights reserved. Clinically reviewed by Lian Garrison, Director, [facility]. [employer] [id number] - REV 08/23.

## 2024-09-06 ENCOUNTER — PATIENT OUTREACH (OUTPATIENT)
Dept: ONCOLOGY | Facility: CLINIC | Age: 54
End: 2024-09-06
Payer: COMMERCIAL

## 2024-09-06 ENCOUNTER — TELEPHONE (OUTPATIENT)
Dept: MAMMOGRAPHY | Facility: CLINIC | Age: 54
End: 2024-09-06
Payer: COMMERCIAL

## 2024-09-06 DIAGNOSIS — C50.911 MALIGNANT NEOPLASM OF RIGHT BREAST (H): Primary | ICD-10-CM

## 2024-09-06 LAB
PATH REPORT.COMMENTS IMP SPEC: ABNORMAL
PATH REPORT.COMMENTS IMP SPEC: YES
PATH REPORT.FINAL DX SPEC: ABNORMAL
PATH REPORT.GROSS SPEC: ABNORMAL
PATH REPORT.MICROSCOPIC SPEC OTHER STN: ABNORMAL
PATH REPORT.MICROSCOPIC SPEC OTHER STN: ABNORMAL
PATHOLOGY SYNOPTIC REPORT: ABNORMAL
PHOTO IMAGE: ABNORMAL

## 2024-09-06 NOTE — PROGRESS NOTES
New Patient Oncology Nurse Navigator Note     Referring provider: Jeanette Fatima PA-C      Referring Clinic/Organization: [facility]     Referred to (specialty:) Medical Oncology      Date Referral Received: September 6, 2024     Evaluation for:  C50.911 (ICD-10-CM) - Malignant neoplasm of right breast (H)     Clinical History (per Nurse review of records provided):      Giuliana Cameron is a 53 year old female who had bilateral screening mammograms on 8/28/24 revealing a possible mass in the right breast in the upper inner quadrant, posterior depth. Right breast ultrasound followed on 9/4 and irregularly-shaped hypoechoic mass with indistinct margins and posterior acoustic shadowing is seen at 1:00 8 cm from the nipple on the RIGHT. This measures 1.6 x 1.1 x 1.0 cm and accounts for the  mammographic finding.  RIGHT axillary ultrasound survey demonstrates normal-appearing lymph nodes.     9/4/24 - Case: [id number]                                   A.  Right breast, 1:00, 8 cm from the nipple, 1.6 cm size, high suspicion mass, ultrasound-guided core biopsy:  -INVASIVE CARCINOMA OF NO SPECIAL TYPE (DUCTAL).  -Callands Grade  1  (Tubule score = 2, Nuclear score = 2, Mitotic score = 1).  -Size:  Involving all 3 tissue cores and measuring at least 9 mm in greatest linear extent.  -Lymphatic/Vascular Invasion:   Not identified.  -Ductal Carcinoma in situ (DCIS):   Cribriform type, nuclear grade 2, without necrosis.  -Lobular Carcinoma in situ (LCIS):   Not identified.  -Microcalcifications: Present, associated with invasive carcinoma.  -ESTROGEN RECEPTOR (ER): Positive, 100%, strong.  -PROGESTERONE RECEPTOR (NE): Positive, 100%, strong.  -HER2(IHC):  Negative    Patient is scheduled for surgery consult with Dr. Hurst on 9/10 at 9:00am.     Lumpectomy planned for 9/23/24.  (No Natali p/o Follow-up scheduled yet)      9/23/24 - Case: [id number]                                   A.  Right  breast, lumpectomy:  -- Invasive ductal carcinoma, Juanjose grade 1 (of 3), spanning 1.6 cm.  -- Ductal carcinoma in situ (DCIS), nuclear grade 2 (of 3), spanning 6 consecutive slices and an estimated 3.9 cm.  -- Invasive carcinoma is located less than 0.1 cm from the closest inferior margin (not a true margin, see below).  -- DCIS is located less than 0.1 cm from the closest posterior margin.  -- See cancer staging synopsis for additional details and final margin status.     B.  Lymph node, right axilla, sentinel lymph node biopsy:  -- One lymph node negative for metastatic carcinoma (0/1).     C.  Right breast, inferior margin reexcision:  -- Ductal carcinoma in situ, multiple foci, present at new margin.      9/10 1154 - Telephoned and spoke with Giuliana. Explained my role and purpose for the call. Writer received referral, reviewed for appropriate plan, and call warm transferred to New Patient Scheduling for completion.    Patient resides in [place], [place].     Records Location: See Bookmarked material

## 2024-09-06 NOTE — TELEPHONE ENCOUNTER
Call placed to patient.  verified.     Patient notified that pathology results from right breast biopsy performed on 2024 revealed:    [facility]  Giuliana Cameron [id number]  F, 1970  Surgical Pathology Report (Final result) [id number]  Authorizing Provider: Jeanette Fatima PA-C Ordering Provider: Jeanette Fatima PA-C  Ordering Location: [facility]  Collected: 2024 10:24 AM  Pathologist: Taylor Mcpherson MD Received: 2024 11:07 AM  .  Specimens  A Breast, Right, Breast Biopsy Needle  .  .  Final Diagnosis  A. Right breast, 1:00, 8 cm from the nipple, 1.6 cm size, high suspicion mass, ultrasound-guided core biopsy:  -INVASIVE CARCINOMA OF NO SPECIAL TYPE (DUCTAL).  -Sandstone Grade 1 (Tubule score = 2, Nuclear score = 2, Mitotic score = 1).  -Size: Involving all 3 tissue cores and measuring at least 9 mm in greatest linear extent.  -Lymphatic/Vascular Invasion: Not identified.  -Ductal Carcinoma in situ (DCIS): Cribriform type, nuclear grade 2, without necrosis.  -Lobular Carcinoma in situ (LCIS): Not identified.  -Microcalcifications: Present, associated with invasive carcinoma.  -ESTROGEN RECEPTOR (ER): Positive, 100%, strong.  -PROGESTERONE RECEPTOR (CT): Positive, 100%, strong.  -HER2(IHC): Score 1+.  -See Breast Biomarker Reporting Template  Electronically signed by Taylor Mcpherson MD on 2024 at 2:43 PM     Per [facility] Radiologist, Dr. Miguel Paniagua, results are concordant with imaging findings.     Recommendation: Surgical and Oncology consults. Breast MRI should be considered.     Patient is scheduled to see Dr. Hurst on 9/10/2024 at 9:00 AM check in at 8:45 AM at [facility] [place].  I will place a referral for Oncology.      Ordering provider, Jeanette Fatima PA-C, has been notified of the results and recommendations for follow up. I will forward this note, along with the pathology results.  All patient's  questions answered appropriately and thoroughly. Patient will call our office in the interim with additional questions/concerns.     Both parties in agreement of above plan.      Hedy Judd RN BSN  Procedure Nurse  [facility]  [phone]

## 2024-09-10 ENCOUNTER — TELEPHONE (OUTPATIENT)
Dept: SURGERY | Facility: CLINIC | Age: 54
End: 2024-09-10

## 2024-09-10 ENCOUNTER — OFFICE VISIT (OUTPATIENT)
Dept: SURGERY | Facility: CLINIC | Age: 54
End: 2024-09-10
Payer: COMMERCIAL

## 2024-09-10 VITALS
WEIGHT: 148 LBS | BODY MASS INDEX: 27.23 KG/M2 | HEART RATE: 81 BPM | OXYGEN SATURATION: 98 % | SYSTOLIC BLOOD PRESSURE: 94 MMHG | HEIGHT: 62 IN | DIASTOLIC BLOOD PRESSURE: 64 MMHG

## 2024-09-10 DIAGNOSIS — C50.911 INVASIVE DUCTAL CARCINOMA OF BREAST, FEMALE, RIGHT (H): Primary | ICD-10-CM

## 2024-09-10 PROCEDURE — 99244 OFF/OP CNSLTJ NEW/EST MOD 40: CPT | Performed by: SURGERY

## 2024-09-10 NOTE — TELEPHONE ENCOUNTER
I called Giuliana and we discussed that during her pre-op appointment, her provider will discuss what to stop taking and what she can continue taking up until surgery. Usually, they will want the fish oil stopped, but we leave it up to her primary. She has her pre-op scheduled > 7 days before surgery.     Maggie Fields, RN, BSN, PHN  Breast Care Nurse Coordinator  [facility] [place] [place]  [phone]

## 2024-09-10 NOTE — TELEPHONE ENCOUNTER
Name of caller: Patient    Reason for Call:  Scheduled for lumpectomy & SLN bx 9/23/24.  Patient is taking Fish oil, multivitamin and Vitamin D and would like to know if okay to take any or all of them prior to surgery    Surgeon:  Geoff Hurst MD    Recent Surgery:  No    If yes, when & what type:  N/A      Best phone number to reach pt at is: [phone]   Ok to leave a message with medical info? Yes.  Patient would like very detailed message stating yes or no for all 3 medications

## 2024-09-10 NOTE — LETTER
September 10, 2024          June Prieto MD  [address] [place] [address]      RE:   Giuliana Cameron 1970      Dear Colleague,    Thank you for referring your patient, Giuliana Cameron, to [facility]. Please see a copy of my visit note below.     Giuliana Cameron is a 53 year old female who presented with a right breast mass.  This was identified on screening mammography.  This was followed up with diagnostic mammography and ultrasound which confirmed a 1.6 cm lesion in the right breast.  No other suspicious lesions in the breast, no axillary lymphadenopathy.  This was biopsied and was found to be a grade 1 invasive ductal carcinoma.  This was ER/DC strongly positive, HER2/sid negative.  Patient has no history of previous breast issues.  Has 2 adult children, both of whom she breast-fed.  She is postmenopausal, does not take hormone replacement therapy.  No family history of breast cancer.  Patient is otherwise healthy.  She is here to discuss her diagnosis.     PMH:  Giuliana Cameron  has a past medical history of Dyspepsia and other specified disorders of function of stomach () and Vitamin D deficiencies ().  PSH:  Giuliana Cameron  has a past surgical history that includes  DELIVERY ONLY () and laparoscopic cholecystectomy ().     Home medications and allergies reviewed.     Social History:  Giuliana Cameron  reports that she has never smoked. She has never used smokeless tobacco. She reports that she does not drink alcohol and does not use drugs.  Family History:  Giuliana Cameron family history includes Breast Cancer in her maternal grandmother and paternal grandmother; C.A.D. in her father; Diabetes in her father and mother; Hypertension in her father and mother.     ROS:  The 10 point Review of Systems is negative other than noted in the HPI.  No fevers or chills.  No recent  weight loss or weight gain.     Physical Exam:  Last menstrual period 06/20/2017, not currently breastfeeding.  0 lbs 0 oz  Healthy appearing female in no distress.  Patient has a pleasant affect and communicates well.   Pupils equal round and reactive to light.   No cervical lymphadenopathy or thyromegaly.   Lung fields clear, breathing comfortably.   Heart normal sinus rhythm.  No murmurs rubs or gallops.  Bilateral breast exam performed.  Fairly large, dense breast bilaterally.  Heterogeneous breast density.  No discrete lumps or bumps.  No nipple inversion or axillary lymphadenopathy on either side.  Small amount of bruising in the right superior breast.  Skin warm, dry.  No obvious rashes or lesions.     All new lab and imaging data was reviewed.  This includes pathology reports, personal review of breast mammogram and ultrasound images.      Assessment/plan: Pleasant healthy 53-year-old postmenopausal female who presents with a new diagnosis of breast cancer.  This is a right breast lesion which was picked up on screening.  Small, less than 2 cm.  This is a grade 1 hormone receptor positive HER2/sid negative ductal cancer.  We discussed the components of breast cancer management which can include surgery, radiation, chemotherapy, or endocrine therapy.  The decision to recommend chemotherapy will likely be reserved until Oncotype has been sent.  I have recommended surgery first, either in the form of lumpectomy or mastectomy.  I explained that lumpectomy and mastectomy carry similar survival rates.  The patient is favoring right lumpectomy with sentinel lymph node biopsy.  She understands this would be followed by radiation therapy and endocrine therapy.  We will get this scheduled at her convenience.  Surgical co-morbities include esophageal reflux, hyperlipidemia, uterine bruits.    Again, thank you for allowing me to participate in the care of your patient.      Sincerely,      Geoff Hurst MD

## 2024-09-10 NOTE — PROGRESS NOTES
Breast Health History Form    Do you have any of the following symptoms?  Slight pain     In which breast are you having the symptoms?  right  Have you had a mammogram?  Yes, [place] August 28 th 2024        Have you ever had a breast cyst drained?  no        Have you had a breast biopsy in the past?  Yes right 09/04/24        Have you ever had Breast Cancer?  Yes right, 09/06/24        Is there a history of Breast Cancer in your family?  N/a     Have you ever had Ovarian Cancer?  no     Is there a history of Ovarian Cancer in your family?  no     Is there a history of Colon Cancer in your family?  no     Is there a history of Uterine Cancer in your family?  no     Any known genetic abnormalities in your family?  no     Summarize your caffeine intake?  1-2 cups    Were you born with a uterus?    What age did your periods begin?  11  Date your last menstrual period began?  2019  Number of full term pregnancies?  2  Your age when your first child was born?  30  Did you nurse your children?  yes  Are you pregnant now?  no  Have you begun Menopause?  Yes 48     Have you had either ovary removed?  no     Have you had an intrauterine device (IUD) placed?  no         Do you have breast implants?  no  Have you used hormone replacement therapy?  no        Have you taken oral contraceptive pills?  no     What is your current bra size?  36D

## 2024-09-10 NOTE — PROGRESS NOTES
Surgery Consultation, [facility] [place] Hurst MD, MD    Giuliana Cameron MRN# [id number]   YOB: 1970 Age: 53 year old     PCP:  June Prieto [phone]    Chief Complaint: New diagnosis right breast cancer    Pt was seen in consultation from June Prieto.    History of Present Illness:  Giuliana Cameron is a 53 year old female who presented with a right breast mass.  This was identified on screening mammography.  This was followed up with diagnostic mammography and ultrasound which confirmed a 1.6 cm lesion in the right breast.  No other suspicious lesions in the breast, no axillary lymphadenopathy.  This was biopsied and was found to be a grade 1 invasive ductal carcinoma.  This was ER/[place] strongly positive, HER2/sid negative.  Patient has no history of previous breast issues.  Has 2 adult children, both of whom she breast-fed.  She is postmenopausal, does not take hormone replacement therapy.  No family history of breast cancer.  Patient is otherwise healthy.  She is here to discuss her diagnosis.    PMH:  Giuliana Cameron  has a past medical history of Dyspepsia and other specified disorders of function of stomach () and Vitamin D deficiencies ().  PSH:  Giuliana Cameron  has a past surgical history that includes  DELIVERY ONLY () and laparoscopic cholecystectomy ().    Home medications and allergies reviewed.    Social History:  Giuliana Cameron  reports that she has never smoked. She has never used smokeless tobacco. She reports that she does not drink alcohol and does not use drugs.  Family History:  Giuliana Cameron family history includes Breast Cancer in her maternal grandmother and paternal grandmother; C.A.D. in her father; Diabetes in her father and mother; Hypertension in her father and mother.    ROS:  The 10 point Review of Systems is negative other than noted in the HPI.  No fevers or  chills.  No recent weight loss or weight gain.    Physical Exam:  Last menstrual period 06/20/2017, not currently breastfeeding.  0 lbs 0 oz  Healthy appearing female in no distress.  Patient has a pleasant affect and communicates well.   Pupils equal round and reactive to light.   No cervical lymphadenopathy or thyromegaly.   Lung fields clear, breathing comfortably.   Heart normal sinus rhythm.  No murmurs rubs or gallops.  Bilateral breast exam performed.  Fairly large, dense breast bilaterally.  Heterogeneous breast density.  No discrete lumps or bumps.  No nipple inversion or axillary lymphadenopathy on either side.  Small amount of bruising in the right superior breast.  Skin warm, dry.  No obvious rashes or lesions.    All new lab and imaging data was reviewed.  This includes pathology reports, personal review of breast mammogram and ultrasound images.     Assessment/plan: Pleasant healthy 53-year-old postmenopausal female who presents with a new diagnosis of breast cancer.  This is a right breast lesion which was picked up on screening.  Small, less than 2 cm.  This is a grade 1 hormone receptor positive HER2/sid negative ductal cancer.  We discussed the components of breast cancer management which can include surgery, radiation, chemotherapy, or endocrine therapy.  The decision to recommend chemotherapy will likely be reserved until Oncotype has been sent.  I have recommended surgery first, either in the form of lumpectomy or mastectomy.  I explained that lumpectomy and mastectomy carry similar survival rates.  The patient is favoring right lumpectomy with sentinel lymph node biopsy.  She understands this would be followed by radiation therapy and endocrine therapy.  We will get this scheduled at her convenience.  Surgical co-morbities include esophageal reflux, hyperlipidemia, uterine bruits.    Jalen Hurst M.D.  [facility]  [phone]    Please route or send letter to:  Primary Care  Provider (PCP) and Referring Provider

## 2024-09-10 NOTE — NURSING NOTE
Breast Nurse Care Coordination:      I introduced self to patient and , and explained my role of breast nurse coordinator. I accompanied Elgin to her surgical consultation on 9/10/24 with Dr. Hurst at the [facility].       Giuliana was given the new breast cancer patient packet which includes educational material and support resources such as American Cancer Society, Fighting Cancer through Diet and Lifestyle, [employer], [employer], Pathways and the [facility] Breast Cancer Support Group.  I also enclosed a copy of her right breast biopsy pathology report (9/4/24).       At the end of the consultation, we reviewed Giuliana's plan of care and education.  The plan is for patient to have a left lumpectomy with left SLNBx on 9/23/24 with Dr. Hurst She is aware that she will need a pre-op exam with her PCP within 30 days before surgery. .      I gave patient educational materials regarding Exercises After Breast Surgery and Lumpectomy.  Informed patient for lumpectomy surgery, she will want to have two good supportive sports bras that open in the front to wear the 2 weeks following her surgery. I gave patient information on different options to purchase sports bras.      I answered her questions and encouraged patient to call me back with any future questions or concerns.  Giuliana has my contact information, and knows to contact me in the future with any questions or concerns.     Maggie Fields, RN, BSN, PHN  Breast Care Nurse Coordinator  [facility] [place]  [phone]

## 2024-09-11 ENCOUNTER — TELEPHONE (OUTPATIENT)
Dept: SURGERY | Facility: CLINIC | Age: 54
End: 2024-09-11
Payer: COMMERCIAL

## 2024-09-13 ENCOUNTER — TELEPHONE (OUTPATIENT)
Dept: SURGERY | Facility: CLINIC | Age: 54
End: 2024-09-13
Payer: COMMERCIAL

## 2024-09-13 DIAGNOSIS — N64.4 BREAST PAIN, LEFT: Primary | ICD-10-CM

## 2024-09-13 NOTE — TELEPHONE ENCOUNTER
Giuliana and Husam () called with the following questions:    1) They are looking for financial assistance resources. I gave them our financial assistance/billing phone number for [place], as well as some additional resources like [employer] and [employer]. I asked if they would like to meet with a  to discuss financial resources further. They declined for now and will start at these things first.     2) Giuliana would like an US of her left breast (previous screening mammogram on 8/28/24 was negative) to make sure nothing is there. She reports some intermittent mild pain on that breast. She is uncertain if this is new or not. She would like this to be done at the same time as her Localizer.     I will consult with Dr. Hurst to advise if he recommends a left breast US.     3) Eunice is considering moving her surgery out until October and would like to know Dr. Hurst's next surgery date. I told her I can have the schedulers call her, but this surgery date is likely to change quickly and may not be available in a few days. They still wanted a specific date. I will have our schedulers reach out to Giuliana with this information.     I attempted to get her scheduled for a post op appointment, but they had something to get too. I will ask that the surgery schedule notify Giuliana of her post op appointment as well on 10/8/24 @ 9 am.    Questions answered. Will route to Dr. Hurst and our surgery schedulers to advise.     Maggie Fields, RN, BSN, PHN  Breast Care Nurse Coordinator  [facility] [place]- [facility] [place]  [phone]

## 2024-09-13 NOTE — TELEPHONE ENCOUNTER
Dr. Hurst is advising that for her new left breast pain, that she have a diagnostic mammogram and left breast US.     She is scheduled for this on 9/19/24 @ 2 pm at [facility]. I left a message with this information.    She is also thinking of having surgery on 10/23/24 instead of in September. I told her this is a safe and typical timeframe, but she feels more comfortable if Dr. Hurst says this is fine.     I will check with Dr. Hurst on the surgery timeframe.     Maggie Fields, RN, BSN, PHN  Breast Care Nurse Coordinator  [facility] [place]- [facility] [place]  [phone]

## 2024-09-17 ENCOUNTER — TELEPHONE (OUTPATIENT)
Dept: SURGERY | Facility: CLINIC | Age: 54
End: 2024-09-17
Payer: COMMERCIAL

## 2024-09-17 NOTE — TELEPHONE ENCOUNTER
Giuliana called with a few questions/updates:     1) She has canceled the diagnostic imaging on the left, as the bilateral screening mammogram on 8/24/24 came back benign in the left breast. She also doesn't want to go through that right now and wants to focus on getting the cancer out of the right breast.     We discussed that if she waits until doing imaging until later, if something was found that needed to be removed, she would then need a separate surgery. She is okay with this.     I will route to Dr. Hurst, so he is aware that she doesn't wish to pursue further imaging on that left breast at this time.     2) Giuliana had questions about what her insurance will cover. I provided her the official name of the surgery, as well as where the surgery is being performed. We discussed that pathology will be done by [place] and Anesthesia will be done by [facility]. They will check and make sure this will all be covered by insurance.     3) Giuliana had questions about what the incision will look like- Questions answered.     She would like to keep her surgery on 9/23/24. She has her pre-op scheduled on 9/19/24 and we discussed what the day of surgery will look like.     Questions answered. Pt verbalized understanding.     Maggie Fields, RN, BSN, PHN  Breast Care Nurse Coordinator  [facility] [place]  [phone]

## 2024-09-19 ENCOUNTER — OFFICE VISIT (OUTPATIENT)
Dept: FAMILY MEDICINE | Facility: CLINIC | Age: 54
End: 2024-09-19
Payer: COMMERCIAL

## 2024-09-19 ENCOUNTER — ANCILLARY PROCEDURE (OUTPATIENT)
Dept: GENERAL RADIOLOGY | Facility: CLINIC | Age: 54
End: 2024-09-19
Attending: INTERNAL MEDICINE
Payer: COMMERCIAL

## 2024-09-19 VITALS
OXYGEN SATURATION: 99 % | DIASTOLIC BLOOD PRESSURE: 74 MMHG | RESPIRATION RATE: 16 BRPM | TEMPERATURE: 97 F | WEIGHT: 147.4 LBS | HEIGHT: 61 IN | SYSTOLIC BLOOD PRESSURE: 111 MMHG | HEART RATE: 71 BPM | BODY MASS INDEX: 27.83 KG/M2

## 2024-09-19 DIAGNOSIS — Z01.818 PREOPERATIVE CLEARANCE: Primary | ICD-10-CM

## 2024-09-19 DIAGNOSIS — Z01.818 PREOPERATIVE CLEARANCE: ICD-10-CM

## 2024-09-19 DIAGNOSIS — U09.9 POST-COVID CHRONIC COUGH: ICD-10-CM

## 2024-09-19 DIAGNOSIS — K21.9 GASTROESOPHAGEAL REFLUX DISEASE WITHOUT ESOPHAGITIS: ICD-10-CM

## 2024-09-19 DIAGNOSIS — Z23 NEED FOR VACCINATION: ICD-10-CM

## 2024-09-19 DIAGNOSIS — C50.911 INVASIVE DUCTAL CARCINOMA OF BREAST, FEMALE, RIGHT (H): ICD-10-CM

## 2024-09-19 DIAGNOSIS — R05.3 POST-COVID CHRONIC COUGH: ICD-10-CM

## 2024-09-19 PROBLEM — M75.121 NONTRAUMATIC COMPLETE TEAR OF RIGHT ROTATOR CUFF: Status: ACTIVE | Noted: 2022-02-14

## 2024-09-19 LAB
ANION GAP SERPL CALCULATED.3IONS-SCNC: 9 MMOL/L (ref 7–15)
BUN SERPL-MCNC: 11.4 MG/DL (ref 6–20)
CALCIUM SERPL-MCNC: 9.3 MG/DL (ref 8.8–10.4)
CHLORIDE SERPL-SCNC: 105 MMOL/L (ref 98–107)
CREAT SERPL-MCNC: 0.69 MG/DL (ref 0.51–0.95)
EGFRCR SERPLBLD CKD-EPI 2021: >90 ML/MIN/1.73M2
ERYTHROCYTE [DISTWIDTH] IN BLOOD BY AUTOMATED COUNT: 12.8 % (ref 10–15)
GLUCOSE SERPL-MCNC: 97 MG/DL (ref 70–99)
HCO3 SERPL-SCNC: 25 MMOL/L (ref 22–29)
HCT VFR BLD AUTO: 38.5 % (ref 35–47)
HGB BLD-MCNC: 13.2 G/DL (ref 11.7–15.7)
MCH RBC QN AUTO: 28.8 PG (ref 26.5–33)
MCHC RBC AUTO-ENTMCNC: 34.3 G/DL (ref 31.5–36.5)
MCV RBC AUTO: 84 FL (ref 78–100)
PLATELET # BLD AUTO: 204 10E3/UL (ref 150–450)
POTASSIUM SERPL-SCNC: 4.5 MMOL/L (ref 3.4–5.3)
RBC # BLD AUTO: 4.58 10E6/UL (ref 3.8–5.2)
SODIUM SERPL-SCNC: 139 MMOL/L (ref 135–145)
WBC # BLD AUTO: 5.1 10E3/UL (ref 4–11)

## 2024-09-19 PROCEDURE — 85027 COMPLETE CBC AUTOMATED: CPT | Performed by: INTERNAL MEDICINE

## 2024-09-19 PROCEDURE — 71046 X-RAY EXAM CHEST 2 VIEWS: CPT | Mod: TC | Performed by: RADIOLOGY

## 2024-09-19 PROCEDURE — 80048 BASIC METABOLIC PNL TOTAL CA: CPT | Performed by: INTERNAL MEDICINE

## 2024-09-19 PROCEDURE — 93000 ELECTROCARDIOGRAM COMPLETE: CPT | Performed by: INTERNAL MEDICINE

## 2024-09-19 PROCEDURE — 36415 COLL VENOUS BLD VENIPUNCTURE: CPT | Performed by: INTERNAL MEDICINE

## 2024-09-19 PROCEDURE — 99214 OFFICE O/P EST MOD 30 MIN: CPT | Performed by: INTERNAL MEDICINE

## 2024-09-19 RX ORDER — ALBUTEROL SULFATE 90 UG/1
2 AEROSOL, METERED RESPIRATORY (INHALATION) EVERY 6 HOURS PRN
Qty: 18 G | Refills: 1 | Status: SHIPPED | OUTPATIENT
Start: 2024-09-19

## 2024-09-19 RX ORDER — GUAIFENESIN 600 MG/1
TABLET, EXTENDED RELEASE ORAL
COMMUNITY

## 2024-09-19 ASSESSMENT — PAIN SCALES - GENERAL: PAINLEVEL: NO PAIN (0)

## 2024-09-19 NOTE — PATIENT INSTRUCTIONS
As discussed, please do the pertinent work up needed at this time.     Will start on Albuterol as needed for cough   Will check X ray given the ongoing cough and never had X ray in the past     =================================================    How to Take Your Medication Before Surgery  Preoperative Medication Instructions   Antiplatelet or Anticoagulation Medication Instructions   - Patient is on no antiplatelet or anticoagulation medications.    Additional Medication Instructions  Take all scheduled medications on the day of surgery EXCEPT for modifications listed below:   - Herbal medications and vitamins: DO NOT TAKE 14 days prior to surgery.       Patient Education   Preparing for Your Surgery  Getting started  A nurse will call you to review your health history and instructions. They will give you an arrival time based on your scheduled surgery time. Please be ready to share:  Your doctor's clinic name and phone number  Your medical, surgical, and anesthesia history  A list of allergies and sensitivities  A list of medicines, including herbal treatments and over-the-counter drugs  Whether the patient has a legal guardian (ask how to send us the papers in advance)  Please tell us if you're pregnant--or if there's any chance you might be pregnant. Some surgeries may injure a fetus (unborn baby), so they require a pregnancy test. Surgeries that are safe for a fetus don't always need a test, and you can choose whether to have one.   If you have a child who's having surgery, please ask for a copy of Preparing for Your Child's Surgery.    Preparing for surgery  Within 10 to 30 days of surgery: Have a pre-op exam (sometimes called an H&P, or History and Physical). This can be done at a clinic or pre-operative center.  If you're having a , you may not need this exam. Talk to your care team.  At your pre-op exam, talk to your care team about all medicines you take. If you need to stop any medicines before  surgery, ask when to start taking them again.  We do this for your safety. Many medicines can make you bleed too much during surgery. Some change how well surgery (anesthesia) drugs work.  Call your insurance company to let them know you're having surgery. (If you don't have insurance, call [phone].)  Call your clinic if there's any change in your health. This includes signs of a cold or flu (sore throat, runny nose, cough, rash, fever). It also includes a scrape or scratch near the surgery site.  If you have questions on the day of surgery, call your hospital or surgery center.  Eating and drinking guidelines  For your safety: Unless your surgeon tells you otherwise, follow the guidelines below.  Eat and drink as usual until 8 hours before you arrive for surgery. After that, no food or milk.  Drink clear liquids until 2 hours before you arrive. These are liquids you can see through, like water, Gatorade, and Propel Water. They also include plain black coffee and tea (no cream or milk), candy, and breath mints. You can spit out gum when you arrive.  If you drink alcohol: Stop drinking it the night before surgery.  If your care team tells you to take medicine on the morning of surgery, it's okay to take it with a sip of water.  Preventing infection  Shower or bathe the night before and morning of your surgery. Follow the instructions your clinic gave you. (If no instructions, use regular soap.)  Don't shave or clip hair near your surgery site. We'll remove the hair if needed.  Don't smoke or vape the morning of surgery. You may chew nicotine gum up to 2 hours before surgery. A nicotine patch is okay.  Note: Some surgeries require you to completely quit smoking and nicotine. Check with your surgeon.  Your care team will make every effort to keep you safe from infection. We will:  Clean our hands often with soap and water (or an alcohol-based hand rub).  Clean the skin at your surgery site with a special soap that  kills germs.  Give you a special gown to keep you warm. (Cold raises the risk of infection.)  Wear special hair covers, masks, gowns and gloves during surgery.  Give antibiotic medicine, if prescribed. Not all surgeries need antibiotics.  What to bring on the day of surgery  Photo ID and insurance card  Copy of your health care directive, if you have one  Glasses and hearing aids (bring cases)  You can't wear contacts during surgery  Inhaler and eye drops, if you use them (tell us about these when you arrive)  CPAP machine or breathing device, if you use them  A few personal items, if spending the night  If you have . . .  A pacemaker, ICD (cardiac defibrillator) or other implant: Bring the ID card.  An implanted stimulator: Bring the remote control.  A legal guardian: Bring a copy of the certified (court-stamped) guardianship papers.  Please remove any jewelry, including body piercings. Leave jewelry and other valuables at home.  If you're going home the day of surgery  You must have a responsible adult drive you home. They should stay with you overnight as well.  If you don't have someone to stay with you, and you aren't safe to go home alone, we may keep you overnight. Insurance often won't pay for this.  After surgery  If it's hard to control your pain or you need more pain medicine, please call your surgeon's office.  Questions?   If you have any questions for your care team, list them here: _________________________________________________________________________________________________________________________________________________________________________ ____________________________________ ____________________________________ ____________________________________  For informational purposes only. Not to replace the advice of your health care provider. Copyright   2003, 2019 [facility]. All rights reserved. Clinically reviewed by Radha Turner MD. [employer] 896534 - REV 12/22.

## 2024-09-20 ENCOUNTER — HOSPITAL ENCOUNTER (OUTPATIENT)
Dept: MAMMOGRAPHY | Facility: CLINIC | Age: 54
Discharge: HOME | End: 2024-09-20
Attending: SURGERY
Payer: COMMERCIAL

## 2024-09-20 DIAGNOSIS — C50.911 INVASIVE DUCTAL CARCINOMA OF BREAST, FEMALE, RIGHT (H): ICD-10-CM

## 2024-09-20 PROCEDURE — 250N000009 HC RX 250: Performed by: RADIOLOGY

## 2024-09-20 PROCEDURE — 999N000065 MA POST PROCEDURE RIGHT

## 2024-09-20 PROCEDURE — 19285 PERQ DEV BREAST 1ST US IMAG: CPT | Mod: RT

## 2024-09-20 RX ADMIN — LIDOCAINE HYDROCHLORIDE 5 ML: 10 INJECTION, SOLUTION INFILTRATION; PERINEURAL at 10:31

## 2024-09-22 ENCOUNTER — ANESTHESIA EVENT (OUTPATIENT)
Dept: SURGERY | Facility: CLINIC | Age: 54
End: 2024-09-22
Payer: COMMERCIAL

## 2024-09-23 ENCOUNTER — HOSPITAL ENCOUNTER (OUTPATIENT)
Dept: MAMMOGRAPHY | Facility: CLINIC | Age: 54
Discharge: HOME | End: 2024-09-23
Attending: SURGERY | Admitting: SURGERY
Payer: COMMERCIAL

## 2024-09-23 ENCOUNTER — HOSPITAL ENCOUNTER (OUTPATIENT)
Facility: CLINIC | Age: 54
Discharge: HOME | End: 2024-09-23
Attending: SURGERY | Admitting: SURGERY
Payer: COMMERCIAL

## 2024-09-23 ENCOUNTER — HOSPITAL ENCOUNTER (OUTPATIENT)
Dept: NUCLEAR MEDICINE | Facility: CLINIC | Age: 54
Discharge: HOME | End: 2024-09-23
Attending: SURGERY | Admitting: SURGERY
Payer: COMMERCIAL

## 2024-09-23 ENCOUNTER — TRANSFERRED RECORDS (OUTPATIENT)
Dept: HEALTH INFORMATION MANAGEMENT | Facility: CLINIC | Age: 54
End: 2024-09-23

## 2024-09-23 ENCOUNTER — ANESTHESIA (OUTPATIENT)
Dept: SURGERY | Facility: CLINIC | Age: 54
End: 2024-09-23
Payer: COMMERCIAL

## 2024-09-23 VITALS
HEIGHT: 62 IN | WEIGHT: 148.7 LBS | DIASTOLIC BLOOD PRESSURE: 65 MMHG | HEART RATE: 68 BPM | OXYGEN SATURATION: 98 % | RESPIRATION RATE: 12 BRPM | TEMPERATURE: 97 F | BODY MASS INDEX: 27.36 KG/M2 | SYSTOLIC BLOOD PRESSURE: 98 MMHG

## 2024-09-23 DIAGNOSIS — C50.911 INVASIVE DUCTAL CARCINOMA OF BREAST, FEMALE, RIGHT (H): ICD-10-CM

## 2024-09-23 DIAGNOSIS — G89.18 POSTOPERATIVE PAIN: Primary | ICD-10-CM

## 2024-09-23 PROCEDURE — 710N000009 HC RECOVERY PHASE 1, LEVEL 1, PER MIN: Performed by: SURGERY

## 2024-09-23 PROCEDURE — 250N000009 HC RX 250: Performed by: SURGERY

## 2024-09-23 PROCEDURE — 250N000011 HC RX IP 250 OP 636: Performed by: SURGERY

## 2024-09-23 PROCEDURE — 250N000011 HC RX IP 250 OP 636: Performed by: ANESTHESIOLOGY

## 2024-09-23 PROCEDURE — 88329 PATH CONSLTJ DRG SURG: CPT | Performed by: PATHOLOGY

## 2024-09-23 PROCEDURE — 999N000104 MA BREAST SPECIMEN RIGHT OR: Mod: TC

## 2024-09-23 PROCEDURE — 19301 PARTIAL MASTECTOMY: CPT | Mod: RT | Performed by: SURGERY

## 2024-09-23 PROCEDURE — 19301 PARTIAL MASTECTOMY: CPT | Performed by: ANESTHESIOLOGY

## 2024-09-23 PROCEDURE — A9520 TC99 TILMANOCEPT DIAG 0.5MCI: HCPCS | Performed by: SURGERY

## 2024-09-23 PROCEDURE — 258N000003 HC RX IP 258 OP 636: Performed by: ANESTHESIOLOGY

## 2024-09-23 PROCEDURE — 999N000141 HC STATISTIC PRE-PROCEDURE NURSING ASSESSMENT: Performed by: SURGERY

## 2024-09-23 PROCEDURE — 38792 RA TRACER ID OF SENTINL NODE: CPT

## 2024-09-23 PROCEDURE — 88307 TISSUE EXAM BY PATHOLOGIST: CPT | Mod: TC | Performed by: SURGERY

## 2024-09-23 PROCEDURE — 19301 PARTIAL MASTECTOMY: CPT | Performed by: NURSE ANESTHETIST, CERTIFIED REGISTERED

## 2024-09-23 PROCEDURE — 250N000009 HC RX 250: Performed by: ANESTHESIOLOGY

## 2024-09-23 PROCEDURE — 370N000017 HC ANESTHESIA TECHNICAL FEE, PER MIN: Performed by: SURGERY

## 2024-09-23 PROCEDURE — 250N000025 HC SEVOFLURANE, PER MIN: Performed by: SURGERY

## 2024-09-23 PROCEDURE — 19301 PARTIAL MASTECTOMY: CPT | Mod: AS | Performed by: PHYSICIAN ASSISTANT

## 2024-09-23 PROCEDURE — 88307 TISSUE EXAM BY PATHOLOGIST: CPT | Mod: 26 | Performed by: PATHOLOGY

## 2024-09-23 PROCEDURE — 38525 BIOPSY/REMOVAL LYMPH NODES: CPT | Mod: 51 | Performed by: SURGERY

## 2024-09-23 PROCEDURE — 250N000013 HC RX MED GY IP 250 OP 250 PS 637: Performed by: PHYSICIAN ASSISTANT

## 2024-09-23 PROCEDURE — 343N000001 HC RX 343: Performed by: SURGERY

## 2024-09-23 PROCEDURE — 710N000012 HC RECOVERY PHASE 2, PER MINUTE: Performed by: SURGERY

## 2024-09-23 PROCEDURE — 360N000082 HC SURGERY LEVEL 2 W/ FLUORO, PER MIN: Performed by: SURGERY

## 2024-09-23 PROCEDURE — 272N000001 HC OR GENERAL SUPPLY STERILE: Performed by: SURGERY

## 2024-09-23 RX ORDER — ONDANSETRON 2 MG/ML
INJECTION INTRAMUSCULAR; INTRAVENOUS PRN
Status: DISCONTINUED | OUTPATIENT
Start: 2024-09-23 | End: 2024-09-23

## 2024-09-23 RX ORDER — AMOXICILLIN 250 MG
1-2 CAPSULE ORAL 2 TIMES DAILY PRN
Qty: 20 TABLET | Refills: 0 | Status: SHIPPED | OUTPATIENT
Start: 2024-09-23

## 2024-09-23 RX ORDER — PROPOFOL 10 MG/ML
INJECTION, EMULSION INTRAVENOUS CONTINUOUS PRN
Status: DISCONTINUED | OUTPATIENT
Start: 2024-09-23 | End: 2024-09-23

## 2024-09-23 RX ORDER — EPHEDRINE SULFATE 50 MG/ML
INJECTION, SOLUTION INTRAMUSCULAR; INTRAVENOUS; SUBCUTANEOUS PRN
Status: DISCONTINUED | OUTPATIENT
Start: 2024-09-23 | End: 2024-09-23

## 2024-09-23 RX ORDER — DEXAMETHASONE SODIUM PHOSPHATE 4 MG/ML
4 INJECTION, SOLUTION INTRA-ARTICULAR; INTRALESIONAL; INTRAMUSCULAR; INTRAVENOUS; SOFT TISSUE
Status: DISCONTINUED | OUTPATIENT
Start: 2024-09-23 | End: 2024-09-23 | Stop reason: HOSPADM

## 2024-09-23 RX ORDER — HYDROCODONE BITARTRATE AND ACETAMINOPHEN 5; 325 MG/1; MG/1
1 TABLET ORAL
Status: COMPLETED | OUTPATIENT
Start: 2024-09-23 | End: 2024-09-23

## 2024-09-23 RX ORDER — HYDROMORPHONE HCL IN WATER/PF 6 MG/30 ML
0.2 PATIENT CONTROLLED ANALGESIA SYRINGE INTRAVENOUS EVERY 5 MIN PRN
Status: DISCONTINUED | OUTPATIENT
Start: 2024-09-23 | End: 2024-09-23 | Stop reason: HOSPADM

## 2024-09-23 RX ORDER — CEFAZOLIN SODIUM/WATER 2 G/20 ML
2 SYRINGE (ML) INTRAVENOUS
Status: COMPLETED | OUTPATIENT
Start: 2024-09-23 | End: 2024-09-23

## 2024-09-23 RX ORDER — NALOXONE HYDROCHLORIDE 0.4 MG/ML
0.1 INJECTION, SOLUTION INTRAMUSCULAR; INTRAVENOUS; SUBCUTANEOUS
Status: DISCONTINUED | OUTPATIENT
Start: 2024-09-23 | End: 2024-09-23 | Stop reason: HOSPADM

## 2024-09-23 RX ORDER — ONDANSETRON 4 MG/1
4 TABLET, ORALLY DISINTEGRATING ORAL EVERY 30 MIN PRN
Status: DISCONTINUED | OUTPATIENT
Start: 2024-09-23 | End: 2024-09-23 | Stop reason: HOSPADM

## 2024-09-23 RX ORDER — FENTANYL CITRATE 50 UG/ML
25 INJECTION, SOLUTION INTRAMUSCULAR; INTRAVENOUS EVERY 5 MIN PRN
Status: DISCONTINUED | OUTPATIENT
Start: 2024-09-23 | End: 2024-09-23 | Stop reason: HOSPADM

## 2024-09-23 RX ORDER — PROPOFOL 10 MG/ML
INJECTION, EMULSION INTRAVENOUS PRN
Status: DISCONTINUED | OUTPATIENT
Start: 2024-09-23 | End: 2024-09-23

## 2024-09-23 RX ORDER — SODIUM CHLORIDE, SODIUM LACTATE, POTASSIUM CHLORIDE, CALCIUM CHLORIDE 600; 310; 30; 20 MG/100ML; MG/100ML; MG/100ML; MG/100ML
INJECTION, SOLUTION INTRAVENOUS CONTINUOUS
Status: DISCONTINUED | OUTPATIENT
Start: 2024-09-23 | End: 2024-09-23 | Stop reason: HOSPADM

## 2024-09-23 RX ORDER — MAGNESIUM HYDROXIDE 1200 MG/15ML
LIQUID ORAL PRN
Status: DISCONTINUED | OUTPATIENT
Start: 2024-09-23 | End: 2024-09-23 | Stop reason: HOSPADM

## 2024-09-23 RX ORDER — FENTANYL CITRATE 50 UG/ML
50 INJECTION, SOLUTION INTRAMUSCULAR; INTRAVENOUS EVERY 5 MIN PRN
Status: DISCONTINUED | OUTPATIENT
Start: 2024-09-23 | End: 2024-09-23 | Stop reason: HOSPADM

## 2024-09-23 RX ORDER — CEFAZOLIN SODIUM/WATER 2 G/20 ML
2 SYRINGE (ML) INTRAVENOUS SEE ADMIN INSTRUCTIONS
Status: DISCONTINUED | OUTPATIENT
Start: 2024-09-23 | End: 2024-09-23 | Stop reason: HOSPADM

## 2024-09-23 RX ORDER — LIDOCAINE HYDROCHLORIDE 20 MG/ML
INJECTION, SOLUTION INFILTRATION; PERINEURAL PRN
Status: DISCONTINUED | OUTPATIENT
Start: 2024-09-23 | End: 2024-09-23

## 2024-09-23 RX ORDER — BUPIVACAINE HYDROCHLORIDE 5 MG/ML
INJECTION, SOLUTION PERINEURAL PRN
Status: DISCONTINUED | OUTPATIENT
Start: 2024-09-23 | End: 2024-09-23 | Stop reason: HOSPADM

## 2024-09-23 RX ORDER — ONDANSETRON 2 MG/ML
4 INJECTION INTRAMUSCULAR; INTRAVENOUS EVERY 30 MIN PRN
Status: DISCONTINUED | OUTPATIENT
Start: 2024-09-23 | End: 2024-09-23 | Stop reason: HOSPADM

## 2024-09-23 RX ORDER — DEXAMETHASONE SODIUM PHOSPHATE 4 MG/ML
INJECTION, SOLUTION INTRA-ARTICULAR; INTRALESIONAL; INTRAMUSCULAR; INTRAVENOUS; SOFT TISSUE PRN
Status: DISCONTINUED | OUTPATIENT
Start: 2024-09-23 | End: 2024-09-23

## 2024-09-23 RX ORDER — HYDROCODONE BITARTRATE AND ACETAMINOPHEN 5; 325 MG/1; MG/1
1 TABLET ORAL EVERY 4 HOURS PRN
Qty: 8 TABLET | Refills: 0 | Status: SHIPPED | OUTPATIENT
Start: 2024-09-23

## 2024-09-23 RX ORDER — FENTANYL CITRATE 50 UG/ML
INJECTION, SOLUTION INTRAMUSCULAR; INTRAVENOUS PRN
Status: DISCONTINUED | OUTPATIENT
Start: 2024-09-23 | End: 2024-09-23

## 2024-09-23 RX ORDER — HYDROMORPHONE HCL IN WATER/PF 6 MG/30 ML
0.4 PATIENT CONTROLLED ANALGESIA SYRINGE INTRAVENOUS EVERY 5 MIN PRN
Status: DISCONTINUED | OUTPATIENT
Start: 2024-09-23 | End: 2024-09-23 | Stop reason: HOSPADM

## 2024-09-23 RX ADMIN — PHENYLEPHRINE HYDROCHLORIDE 100 MCG: 10 INJECTION INTRAVENOUS at 10:34

## 2024-09-23 RX ADMIN — PHENYLEPHRINE HYDROCHLORIDE 200 MCG: 10 INJECTION INTRAVENOUS at 10:37

## 2024-09-23 RX ADMIN — PROPOFOL 150 MG: 10 INJECTION, EMULSION INTRAVENOUS at 10:28

## 2024-09-23 RX ADMIN — Medication 5 MG: at 10:47

## 2024-09-23 RX ADMIN — ONDANSETRON 4 MG: 2 INJECTION INTRAMUSCULAR; INTRAVENOUS at 11:38

## 2024-09-23 RX ADMIN — ONDANSETRON 4 MG: 4 TABLET, ORALLY DISINTEGRATING ORAL at 13:56

## 2024-09-23 RX ADMIN — SODIUM CHLORIDE, POTASSIUM CHLORIDE, SODIUM LACTATE AND CALCIUM CHLORIDE: 600; 310; 30; 20 INJECTION, SOLUTION INTRAVENOUS at 10:01

## 2024-09-23 RX ADMIN — DEXAMETHASONE SODIUM PHOSPHATE 8 MG: 4 INJECTION, SOLUTION INTRA-ARTICULAR; INTRALESIONAL; INTRAMUSCULAR; INTRAVENOUS; SOFT TISSUE at 10:34

## 2024-09-23 RX ADMIN — Medication 5 MG: at 10:44

## 2024-09-23 RX ADMIN — TILMANOCEPT 0.51 MILLICURIE: KIT at 09:50

## 2024-09-23 RX ADMIN — PROPOFOL 30 MCG/KG/MIN: 10 INJECTION, EMULSION INTRAVENOUS at 10:30

## 2024-09-23 RX ADMIN — FENTANYL CITRATE 50 MCG: 50 INJECTION INTRAMUSCULAR; INTRAVENOUS at 10:42

## 2024-09-23 RX ADMIN — FENTANYL CITRATE 50 MCG: 50 INJECTION, SOLUTION INTRAMUSCULAR; INTRAVENOUS at 12:07

## 2024-09-23 RX ADMIN — HYDROCODONE BITARTRATE AND ACETAMINOPHEN 1 TABLET: 5; 325 TABLET ORAL at 12:53

## 2024-09-23 RX ADMIN — MIDAZOLAM 2 MG: 1 INJECTION INTRAMUSCULAR; INTRAVENOUS at 10:24

## 2024-09-23 RX ADMIN — LIDOCAINE HYDROCHLORIDE 60 MG: 20 INJECTION, SOLUTION INFILTRATION; PERINEURAL at 10:28

## 2024-09-23 RX ADMIN — Medication 2 G: at 10:24

## 2024-09-23 RX ADMIN — FENTANYL CITRATE 50 MCG: 50 INJECTION, SOLUTION INTRAMUSCULAR; INTRAVENOUS at 12:32

## 2024-09-23 RX ADMIN — FENTANYL CITRATE 50 MCG: 50 INJECTION INTRAMUSCULAR; INTRAVENOUS at 10:28

## 2024-09-23 ASSESSMENT — ACTIVITIES OF DAILY LIVING (ADL)
ADLS_ACUITY_SCORE: 35

## 2024-09-23 ASSESSMENT — LIFESTYLE VARIABLES: TOBACCO_USE: 0

## 2024-09-23 NOTE — ANESTHESIA PROCEDURE NOTES
Airway       Patient location during procedure: OR  Staff -        CRNA: Geoff Medina APRN CRNA       Other Anesthesia Staff: Daniele Rubio       Performed By: SRNA and with CRNAs       Procedure performed by resident/fellow/CRNA in presence of a teaching physician.  Indications and Patient Condition       Indications for airway management: amaya-procedural       Induction type:intravenous       Mask difficulty assessment: 0 - not attempted    Final Airway Details       Final airway type: supraglottic airway    Supraglottic Airway Details        Type: LMA       Brand: I-Gel       LMA size: 4    Post intubation assessment        Placement verified by: capnometry, equal breath sounds and chest rise        Number of attempts at approach: 1       Number of other approaches attempted: 0       Secured with: tape       Ease of procedure: easy       Dentition: Intact and Unchanged

## 2024-09-23 NOTE — RESULT ENCOUNTER NOTE
Your X ray is normal, no concerns per radiology review.    Please let me know if you have any questions.  Mckenzie Lee MD on 9/23/2024

## 2024-09-23 NOTE — ANESTHESIA CARE TRANSFER NOTE
Patient: Giuliana Cameron    Procedure: Procedure(s):  LUMPECTOMY, BREAST, LOCALIZED USING RADIOFREQUENCY IDENTIFICATION,  sentinel lymph node biopsy       Diagnosis: Invasive ductal carcinoma of breast, female, right [C50.911]  Diagnosis Additional Information: No value filed.    Anesthesia Type:   General     Note:    Oropharynx: oropharynx clear of all foreign objects and spontaneously breathing  Level of Consciousness: awake  Oxygen Supplementation: face mask  Level of Supplemental Oxygen (L/min / FiO2): 6  Independent Airway: airway patency satisfactory and stable  Dentition: dentition unchanged  Vital Signs Stable: post-procedure vital signs reviewed and stable  Report to RN Given: handoff report given  Patient transferred to: PACU    Handoff Report: Identifed the Patient, Identified the Reponsible Provider, Reviewed the pertinent medical history, Discussed the surgical course, Reviewed Intra-OP anesthesia mangement and issues during anesthesia, Set expectations for post-procedure period and Allowed opportunity for questions and acknowledgement of understanding    Vitals:  Vitals Value Taken Time   /77 09/23/24 1149   Temp     Pulse 92 09/23/24 1149   Resp 28 09/23/24 1151   SpO2 100 % 09/23/24 1151   Vitals shown include unfiled device data.    Electronically Signed By: JOHN Hartmann CRNA  September 23, 2024  11:52 AM

## 2024-09-23 NOTE — OP NOTE
General Surgery Operative Note    PREOPERATIVE DIAGNOSIS:  Invasive ductal carcinoma of breast, female, right [C50.911]    POSTOPERATIVE DIAGNOSIS: Same    PROCEDURE:  RFID LOCALIZED LUMPECTOMY WITH SENTINEL LYMPH NODE BIOPSY, ONCOPLASTIC CLOSURE    ANESTHESIA:  General.    PREOPERATIVE MEDICATIONS:  Ancef    SURGEON:  Geoff Hurst MD    ASSISTANT:  Dorota Delgadillo PA-C  A first assistant was necessary owing to challenging retraction, visualization, and exposure.    INDICATIONS:  New diagnosis right breast cancer.    DESCRIPTION OF PROCEDURE:  The patient was taken to the operating suite, and LMA was placed.  The right breast and axilla were prepped and draped in a sterile fashion.  Surgeon-initiated timeout was acknowledged.  We made a circumareolar incision and took this down through skin and subcutaneous tissue.  We dissected toward the RFID tag and followed it until we were oriented directly above it.  We then created our partial mastectomy specimen by getting around the tissue deep to and around the seed in accordance with the post-placement mammograms.  We took this down to the chest wall and came around the tumor.  Specimen was then removed and inked according to protocol.  Specimen mammogram was performed and we identified the lesion, clip and RFID tag.  Specimen was sent fresh to pathology, where request for gross margin evaluation was made.  We received word that the margins were grossly sufficient, but 1 margin was close.  An additional margin reexcision was performed and this was sent separately.  The lumpectomy cavity had clips placed circumferentially.  We then mobilized the surrounding tissue, both superficially and at the level of the fascia.  This tissue was then reapproximated with 3-0 Vicryl suture to close the cavity and fill the defect.    We then set about performing our sentinel lymph node dissection.  A transverse incision was made through a skin crease into the axilla, and I took this  down through skin and subcutaneous tissue.  We followed an area of elevated radiotracer counts and encountered long tissue.  This was removed and sent as sentinel lymph node biopsy.   We then did a tracer count of the remaining axillary bed and found no significant residual radiotracer uptake.  These nodes were sent for permanent section.  The wounds were irrigated at this point and closed in layers.  The wounds were dressed, and the patient was awakened and taken to the PACU in stable condition.  At the conclusion of the case, all lap and needle counts were correct.      ESTIMATED BLOOD LOSS: 10 mL    INTRAOPERATIVE FINDINGS:  Specimen mammogram demonstrated lesion, clip, and seed.  Grossly normal lymph node.    Geoff Hurst MD, MD

## 2024-09-23 NOTE — TELEPHONE ENCOUNTER
RECORDS STATUS - BREAST    RECORDS REQUESTED FROM: [facility]   NOTES DETAILS STATUS   OFFICE NOTE from referring provider Epic 4/1/2024 - Jeanette Fatima PA-C   OFFICE NOTE from surgeon Epic 9/10/2024 - Dr. Geoff Hurst   DISCHARGE SUMMARY from hospital [facility] 9/23/2024 - [facility]    OPERATIVE REPORT [facility] 9/23/2024 - LUMPECTOMY, BREAST   MEDICATION LIST [facility]    LABS     PATHOLOGY REPORTS  (Tissue diagnosis, Stage, ER/CO percentage positive and intensity of staining, HER2 IHC, FISH, and all biopsies from breast and any distant metastasis)                 [facility] 9/23/2024- [id number]   9/4/2024 - [id number]    IMAGING (NEED IMAGES & REPORT)     MAMMO PACS 9/23/2024-6/30/2009    HP:  6/13/2022   ULTRASOUND PACS US Breast: 9/20/2024, 9/4/2024  US Breast Biopsy: 9/4/2024   NM PACS NM Lymphoscinitigraphy: 9/23/24

## 2024-09-23 NOTE — ANESTHESIA PREPROCEDURE EVALUATION
Anesthesia Pre-Procedure Evaluation    Patient: Giuliana Cameron   MRN: [id number] : 1970        Procedure : Procedure(s):  LUMPECTOMY, BREAST, LOCALIZED USING RADIOFREQUENCY IDENTIFICATION,  sentinel lymph node biopsy          Past Medical History:   Diagnosis Date     Dyspepsia and other specified disorders of function of stomach     pos H.pylori tx'ed     Vitamin D deficiencies       Past Surgical History:   Procedure Laterality Date     LAPAROSCOPIC CHOLECYSTECTOMY  2004    Cholecystectomy, Laparoscopic     SHOULDER SURGERY       ZZC  DELIVERY ONLY  2004    , Low Cervical      Allergies   Allergen Reactions     No Known Allergies       Social History     Tobacco Use     Smoking status: Never     Smokeless tobacco: Never   Substance Use Topics     Alcohol use: No      Wt Readings from Last 1 Encounters:   24 67.4 kg (148 lb 11.2 oz)        Anesthesia Evaluation   Pt has had prior anesthetic.     No history of anesthetic complications       ROS/MED HX  ENT/Pulmonary:    (-) tobacco use, asthma and sleep apnea   Neurologic:       Cardiovascular:       METS/Exercise Tolerance:     Hematologic:       Musculoskeletal:       GI/Hepatic:     (+) GERD, Asymptomatic on medication,                  Renal/Genitourinary:       Endo:       Psychiatric/Substance Use:       Infectious Disease:       Malignancy:   (+) Malignancy, History of Breast.    Other:          Physical Exam    Airway        Mallampati: I   TM distance: > 3 FB   Neck ROM: full   Mouth opening: > 3 cm    Respiratory Devices and Support         Dental       (+) Completely normal teeth      Cardiovascular   cardiovascular exam normal          Pulmonary   pulmonary exam normal            OUTSIDE LABS:  CBC:   Lab Results   Component Value Date    WBC 5.1 2024    WBC 4.9 2024    HGB 13.2 2024    HGB 13.3 2024    HCT 38.5 2024    HCT 39.2 2024     2024    PLT  "180 04/01/2024     BMP:   Lab Results   Component Value Date     09/19/2024     04/01/2024    POTASSIUM 4.5 09/19/2024    POTASSIUM 4.2 04/01/2024    CHLORIDE 105 09/19/2024    CHLORIDE 104 04/01/2024    CO2 25 09/19/2024    CO2 24 04/01/2024    BUN 11.4 09/19/2024    BUN 11.2 04/01/2024    CR 0.69 09/19/2024    CR 0.74 04/01/2024    GLC 97 09/19/2024     (H) 04/01/2024     COAGS: No results found for: \"PTT\", \"INR\", \"FIBR\"  POC:   Lab Results   Component Value Date    HCG Negative 05/18/2012     HEPATIC:   Lab Results   Component Value Date    ALBUMIN 4.5 04/01/2024    PROTTOTAL 7.5 04/01/2024    ALT 37 04/01/2024    AST 32 04/01/2024    ALKPHOS 87 04/01/2024    BILITOTAL 0.3 04/01/2024     OTHER:   Lab Results   Component Value Date    A1C 5.6 04/01/2024    REYMUNDO 9.3 09/19/2024    TSH 1.61 10/17/2022    T4 1.08 10/06/2003    CRP 7.7 05/27/2014    SED 28 (H) 05/27/2014       Anesthesia Plan    ASA Status:  2       Anesthesia Type: General.     - Airway: LMA              Consents    Anesthesia Plan(s) and associated risks, benefits, and realistic alternatives discussed. Questions answered and patient/representative(s) expressed understanding.     - Discussed:     - Discussed with:  Patient            Postoperative Care    Pain management: IV analgesics, Oral pain medications.   PONV prophylaxis: Ondansetron (or other 5HT-3), Dexamethasone or Solumedrol, Background Propofol Infusion     Comments:             Mihaela Bassett I have reviewed the pertinent notes and labs in the chart from the past 30 days and (re)examined the patient.  Any updates or changes from those notes are reflected in this note.              # Overweight: Estimated body mass index is 27.2 kg/m  as calculated from the following:    Height as of this encounter: 1.575 m (5' 2\").    Weight as of this encounter: 67.4 kg (148 lb 11.2 oz).      "

## 2024-09-23 NOTE — BRIEF OP NOTE
[facility]    Brief Operative Note    Pre-operative diagnosis: Invasive ductal carcinoma of breast, female, right [C50.911]  Post-operative diagnosis Same as pre-operative diagnosis    Procedure: LUMPECTOMY, BREAST, LOCALIZED USING RADIOFREQUENCY IDENTIFICATION,, Right - Breast  sentinel lymph node biopsy, Right - Breast    Surgeon: Surgeons and Role:     * Geoff Hurst MD - Primary     * Dorota Delgadillo PA-C - Assisting  Anesthesia: General   Estimated Blood Loss: Minimal, see Op Note  Drains: None  Specimens:   ID Type Source Tests Collected by Time Destination   1 : RIGHT BREAST MASS - RFID TAG - MARGIN READINGS Tissue Breast, Right SURGICAL PATHOLOGY EXAM Geoff Hurst MD 9/23/2024 10:52 AM    2 : RIGHT AXILLARY SENTINEL NODE Tissue Lymph Node(s), Axillary, Right SURGICAL PATHOLOGY EXAM Geoff Hurst MD 9/23/2024 10:52 AM    3 : RE-EXCISED INFERIOR MARGIN Tissue Breast, Right SURGICAL PATHOLOGY EXAM Geoff Hurst MD 9/23/2024 11:25 AM      Findings:   Lumpectomy, additional inferior margin taken following intra-op pathology. [place] lymph node excised .  Complications: None.  Implants: * No implants in log *

## 2024-09-23 NOTE — ANESTHESIA POSTPROCEDURE EVALUATION
Patient: Giuliana Cameron    Procedure: Procedure(s):  LUMPECTOMY, BREAST, LOCALIZED USING RADIOFREQUENCY IDENTIFICATION,  sentinel lymph node biopsy       Anesthesia Type:  General    Note:  Disposition: Inpatient   Postop Pain Control: Uneventful            Sign Out: Well controlled pain   PONV: No   Neuro/Psych: Uneventful            Sign Out: Acceptable/Baseline neuro status   Airway/Respiratory: Uneventful            Sign Out: Acceptable/Baseline resp. status   CV/Hemodynamics: Uneventful            Sign Out: Acceptable CV status; No obvious hypovolemia; No obvious fluid overload   Other NRE: NONE   DID A NON-ROUTINE EVENT OCCUR?            Last vitals:  Vitals Value Taken Time   /73 09/23/24 1230   Temp     Pulse 79 09/23/24 1234   Resp 11 09/23/24 1234   SpO2 100 % 09/23/24 1234   Vitals shown include unfiled device data.    Electronically Signed By: Mihaela Bassett  September 23, 2024  12:35 PM

## 2024-09-23 NOTE — DISCHARGE INSTRUCTIONS
[facility] - [facility]  Discharge Instructions: Post-Operative Breast Surgery    ACTIVITY  Take frequent short walks and increase your activity gradually.    Avoid strenuous physical activity or heavy lifting greater than 15-20 lbs. for 1-2 weeks with arm on the surgery side.  You may climb stairs.  Gentle rotation and stretching of your arms and shoulders will prevent joint stiffness.  You may drive without restrictions when you are not using any prescription pain medication and feel comfortable in a car.  You may return to work/school when you are comfortable without any prescription pain medication.    WOUND CARE  You may remove your ACE wrap/dressing and shower 48 hours after the surgery.  Pat your incisions dry and leave them open to air.  Re-apply dressing (Band-Aids or gauze/tape) as needed for drainage.  You may have steri-strips (looks like white tape) or skin glue on your incisions.  You may peel off the steri-strips 2 weeks after your surgery if they have not peeled off on their own.  Do not soak your incisions in a tub or pool for 2 weeks.   Do not apply any lotions, creams, or ointments to your incisions.  A ridge under your incisions is normal and will gradually resolve.  Wear a supportive bra for 1-2 weeks, day and night.    DIET  Start with liquids, then gradually resume your regular diet as tolerated.   Drink plenty of liquids to stay hydrated.    PAIN  Expect some tenderness and discomfort at the incision site(s).  Use the prescribed pain medication at your discretion.  Expect gradual resolution of your pain over several days.  You may take ibuprofen with food (unless you have been told not to) or acetaminophen/Tylenol instead of or in addition to your prescribed pain medication.  However, if you are taking Norco do not take any additional acetaminophen/Tylenol.  Do not drink alcohol or drive while you are taking pain medications.    EXPECTATIONS  Pain medications can cause  constipation.  Limit use when possible.  Take an over the counter or prescribed stool softener/stimulant, such as Colace or Senna, 1-2 times a day with plenty of water.  You may take a mild over the counter laxative, such as Miralax or a suppository, as needed.    You may discontinue these medications once you are having regular bowel movements and/or are no longer taking your narcotic pain medication.    RETURN APPOINTMENT  Follow up with your surgeon in 2-4 weeks.  Please call the office at [phone] to schedule your appointment.      CALL OUR OFFICE -[phone] IF YOU HAVE:   Chills or fever above 101 F.  Increased redness, warmth, or drainage at your incisions.  Significant bleeding.  Pain not relieved by your pain medication or rest.  Increasing pain after the first 48 hours.  Any other concerns or questions.                 Same Day Surgery Discharge Instructions for  Sedation and General Anesthesia     It's not unusual to feel dizzy, light-headed or faint for up to 24 hours after surgery or while taking pain medication.  If you have these symptoms: sit for a few minutes before standing and have someone assist you when you get up to walk or use the bathroom.    You should rest and relax for the next 24 hours. We recommend you make arrangements to have an adult stay with you for at least 24 hours after your discharge.  Avoid hazardous and strenuous activity.    DO NOT DRIVE any vehicle or operate mechanical equipment for 24 hours following the end of your surgery.  Even though you may feel normal, your reactions may be affected by the medication you have received.    Do not drink alcoholic beverages for 24 hours following surgery.     Slowly progress to your regular diet as you feel able. It's not unusual to feel nauseated and/or vomit after receiving anesthesia.  If you develop these symptoms, drink clear liquids (apple juice, ginger ale, broth, 7-up, etc. ) until you feel better.  If your nausea and  vomiting persists for 24 hours, please notify your surgeon.      All narcotic pain medications, along with inactivity and anesthesia, can cause constipation. Drinking plenty of liquids and increasing fiber intake will help.    For any questions of a medical nature, call your surgeon.    Do not make important decisions for 24 hours.    If you had general anesthesia, you may have a sore throat for a couple of days related to the breathing tube used during surgery.  You may use Cepacol lozenges to help with this discomfort.  If it worsens or if you develop a fever, contact your surgeon.     If you feel your pain is not well managed with the pain medications prescribed by your surgeon, please contact your surgeon's office to let them know so they can address your concerns.            **If you have concerns or questions about your procedure,    please contact Dr Hurst at  [phone]**

## 2024-09-25 ENCOUNTER — MYC MEDICAL ADVICE (OUTPATIENT)
Dept: MAMMOGRAPHY | Facility: CLINIC | Age: 54
End: 2024-09-25
Payer: COMMERCIAL

## 2024-09-25 DIAGNOSIS — C50.911 INVASIVE DUCTAL CARCINOMA OF BREAST, FEMALE, RIGHT (H): Primary | ICD-10-CM

## 2024-09-25 LAB
PATH REPORT.COMMENTS IMP SPEC: ABNORMAL
PATH REPORT.COMMENTS IMP SPEC: ABNORMAL
PATH REPORT.COMMENTS IMP SPEC: YES
PATH REPORT.FINAL DX SPEC: ABNORMAL
PATH REPORT.GROSS SPEC: ABNORMAL
PATH REPORT.INTRAOP OBS SPEC DOC: ABNORMAL
PATH REPORT.MICROSCOPIC SPEC OTHER STN: ABNORMAL
PATH REPORT.RELEVANT HX SPEC: ABNORMAL
PATHOLOGY SYNOPTIC REPORT: ABNORMAL
PHOTO IMAGE: ABNORMAL

## 2024-09-25 NOTE — TELEPHONE ENCOUNTER
Voicemail message from Giuliana received asking when her surgical pathology results will be back. She stated a MyChart message back would be okay.     Maggie Fields, RN, BSN, PHN  Breast Care Nurse Coordinator  [facility] [place]- [facility]- [place]  [phone]

## 2024-09-27 ENCOUNTER — PREP FOR PROCEDURE (OUTPATIENT)
Dept: SURGERY | Facility: CLINIC | Age: 54
End: 2024-09-27
Payer: COMMERCIAL

## 2024-09-27 DIAGNOSIS — C50.911 INVASIVE DUCTAL CARCINOMA OF BREAST, FEMALE, RIGHT (H): Primary | ICD-10-CM

## 2024-09-27 NOTE — TELEPHONE ENCOUNTER
Dr. Hurst spoke to Giuliana about her surgical pathology report from her lumpectomy on 9/23/24. He is recommending a breast MRI next.     Giuliana is scheduled for this on 9/30/24 at 3 pm at [facility]. She is happy we are able to get her in so soon.     She would also like to be scheduled for a re-excision of margins. She discussed that Dr. Hurst mentioned that could be scheduled now as well.     We discussed that with the MRI being on Monday, and that determining our next steps (re-excision vs mastectomy), we'd usually wait until the MRI is back to schedule. Giuliana and her  would really like to schedule the re-excision now. I will route to Dr. Hurst to advise on this.     Maggie Fields, RN, BSN, PHN  Breast Care Nurse Coordinator  [facility]- [facility]- [place]  [phone]

## 2024-09-27 NOTE — TELEPHONE ENCOUNTER
Giuliana called to confirm a post op date and to see if her surgical pathology is back.     Elgin is debating on a post op on 10/15/24 or 10/8/24. She will send a [employer] message today with her decision.     Giuliana and I discussed that her lymph nodes were negative on the surgical pathology report. I told her I am unable to speak to if the margins are negative or not. That recommendation will come from Dr. Hurst. Giuliana is very anxious to hear of the results and is really hoping for a call today from Dr. Hurst. We discussed that he is in surgery all day, but I will notify him that they would like a call.     Maggie Fields, RN, BSN, PHN  Breast Care Nurse Coordinator  [facility]- [facility]- [place]  [phone]

## 2024-09-30 ENCOUNTER — HOSPITAL ENCOUNTER (OUTPATIENT)
Dept: MRI IMAGING | Facility: CLINIC | Age: 54
Discharge: HOME | End: 2024-09-30
Attending: SURGERY | Admitting: SURGERY
Payer: COMMERCIAL

## 2024-09-30 ENCOUNTER — MYC MEDICAL ADVICE (OUTPATIENT)
Dept: FAMILY MEDICINE | Facility: CLINIC | Age: 54
End: 2024-09-30
Payer: COMMERCIAL

## 2024-09-30 DIAGNOSIS — C50.911 INVASIVE DUCTAL CARCINOMA OF BREAST, FEMALE, RIGHT (H): ICD-10-CM

## 2024-09-30 PROCEDURE — A9585 GADOBUTROL INJECTION: HCPCS | Performed by: SURGERY

## 2024-09-30 PROCEDURE — 77049 MRI BREAST C-+ W/CAD BI: CPT

## 2024-09-30 PROCEDURE — 255N000002 HC RX 255 OP 636: Performed by: SURGERY

## 2024-09-30 RX ORDER — GADOBUTROL 604.72 MG/ML
6.5 INJECTION INTRAVENOUS ONCE
Status: COMPLETED | OUTPATIENT
Start: 2024-09-30 | End: 2024-09-30

## 2024-09-30 RX ADMIN — GADOBUTROL 6.5 ML: 604.72 INJECTION INTRAVENOUS at 15:08

## 2024-10-01 ENCOUNTER — PREP FOR PROCEDURE (OUTPATIENT)
Dept: SURGERY | Facility: CLINIC | Age: 54
End: 2024-10-01
Payer: COMMERCIAL

## 2024-10-01 ENCOUNTER — TELEPHONE (OUTPATIENT)
Dept: SURGERY | Facility: CLINIC | Age: 54
End: 2024-10-01
Payer: COMMERCIAL

## 2024-10-01 ENCOUNTER — DOCUMENTATION ONLY (OUTPATIENT)
Dept: ONCOLOGY | Facility: CLINIC | Age: 54
End: 2024-10-01
Payer: COMMERCIAL

## 2024-10-01 NOTE — TELEPHONE ENCOUNTER
Giuliana called with two questions. She is scheduled for a re-excision of margins on 10/4/24 with Dr. Hurst.    1) She notes that she has bruise near the top of her incision. It feels hard, but is not painful. She did have a breast MRI done yesterday, that did show a hematoma, so that is probably what she is feeling.     2) She is wondering what is recommended for surgery, based on her breast MRI done yesterday.     I will route to Dr. Hurst to advise and notify patient of recommendations for surgery.     Maggie Fields, RN, BSN, PHN  Breast Care Nurse Coordinator  [facility]- [place]      FINDINGS:  Right Breast:     Background parenchymal enhancement: Minimal  No concerning areas of enhancement.  There is an irregular complex  mass at the lumpectomy site in the left upper inner breast spanning  approximately 8 cm in maximal AP diameter containing mixed  hyperintense T1 and T2 signal, consistent with a hematoma. Surrounding  nonmasslike enhancement likely reflects postoperative changes,  although residual disease cannot be excluded.     Right Axilla: No lymphadenopathy. Postoperative changes.  Right Internal Mammary Chain: No lymphadenopathy.      Left Breast:      Background parenchymal enhancement: Minimal  No concerning areas of enhancement.       Left Axilla: No lymphadenopathy.   Left Internal Mammary Chain: No lymphadenopathy.                                                                       IMPRESSION: BI-RADS CATEGORY: 2 - Benign.   Findings consistent with postoperative changes in the right upper  inner lumpectomy site with a hematoma. No suspicious enhancing  abnormalities otherwise appreciated.      RECOMMENDED FOLLOW-UP: Routine yearly mammography beginning at age 40  or as discussed with your provider. Clinical follow-up is recommended,  with management dependent on the results/findings of the physical  examination.     GABRIEL PANIAGUA MD

## 2024-10-01 NOTE — TELEPHONE ENCOUNTER
Type of surgery: re-excision positive margin right breast  Location of surgery: [facility] OR  Date and time of surgery: 10/4/24 1:40pm  Surgeon: Dr Hurst  Pre-Op Appt Date: use previous  Post-Op Appt Date: pt to schedule   Packet sent out: Yes  Pre-cert/Authorization completed:  Not Applicable  Date: 10/1/24

## 2024-10-01 NOTE — TELEPHONE ENCOUNTER
I called Giuliana and we discussed that Dr. Hurst reviewed the MRI and is recommending the breast re-excision as scheduled on Friday, 10/4/24. Giuliana had questions about next steps and the surgery day, which we discussed in detail.     She is scheduled for a post op appointment on 10/15/24, which is a little early, but they wanted to keep that appointment and didn't want to move it back. She is going out of town for a few days after the 15th and wanted him to look at it first.     Questions answered. Pt verbalized understanding.     Maggie Fields, RN, BSN, PHN  Breast Care Nurse Coordinator  [facility]- [facility]- [place]

## 2024-10-01 NOTE — NURSING NOTE
Oncotype DX submitted via online portal. Pathology report, facesheet and insurance card uploaded to portal    Appt with Dr Langford scheduled for 10/10/24. Will look to push out to await results.    Beverly Patel RN

## 2024-10-01 NOTE — TELEPHONE ENCOUNTER
See other encounter. Dr. Hurst aware of hematoma after surgery.     Maggie Fields, RN, BSN, PHN  Breast Care Nurse Coordinator  [facility] [place] [facility]- [place]

## 2024-10-04 ENCOUNTER — HOSPITAL ENCOUNTER (OUTPATIENT)
Facility: CLINIC | Age: 54
Discharge: HOME | End: 2024-10-04
Attending: SURGERY | Admitting: SURGERY
Payer: COMMERCIAL

## 2024-10-04 ENCOUNTER — ANESTHESIA (OUTPATIENT)
Dept: SURGERY | Facility: CLINIC | Age: 54
End: 2024-10-04
Payer: COMMERCIAL

## 2024-10-04 ENCOUNTER — ANESTHESIA EVENT (OUTPATIENT)
Dept: SURGERY | Facility: CLINIC | Age: 54
End: 2024-10-04
Payer: COMMERCIAL

## 2024-10-04 ENCOUNTER — APPOINTMENT (OUTPATIENT)
Dept: SURGERY | Facility: PHYSICIAN GROUP | Age: 54
End: 2024-10-04
Payer: COMMERCIAL

## 2024-10-04 VITALS
TEMPERATURE: 97.2 F | OXYGEN SATURATION: 99 % | HEART RATE: 68 BPM | DIASTOLIC BLOOD PRESSURE: 74 MMHG | HEIGHT: 62 IN | BODY MASS INDEX: 27.23 KG/M2 | SYSTOLIC BLOOD PRESSURE: 104 MMHG | RESPIRATION RATE: 16 BRPM | WEIGHT: 148 LBS

## 2024-10-04 DIAGNOSIS — C50.911 INVASIVE DUCTAL CARCINOMA OF BREAST, FEMALE, RIGHT (H): Primary | ICD-10-CM

## 2024-10-04 PROCEDURE — 999N000141 HC STATISTIC PRE-PROCEDURE NURSING ASSESSMENT: Performed by: SURGERY

## 2024-10-04 PROCEDURE — 19301 PARTIAL MASTECTOMY: CPT | Performed by: ANESTHESIOLOGY

## 2024-10-04 PROCEDURE — 250N000011 HC RX IP 250 OP 636: Performed by: ANESTHESIOLOGY

## 2024-10-04 PROCEDURE — 258N000003 HC RX IP 258 OP 636

## 2024-10-04 PROCEDURE — 250N000009 HC RX 250

## 2024-10-04 PROCEDURE — 710N000009 HC RECOVERY PHASE 1, LEVEL 1, PER MIN: Performed by: SURGERY

## 2024-10-04 PROCEDURE — 370N000017 HC ANESTHESIA TECHNICAL FEE, PER MIN: Performed by: SURGERY

## 2024-10-04 PROCEDURE — 250N000011 HC RX IP 250 OP 636: Performed by: SURGERY

## 2024-10-04 PROCEDURE — 88307 TISSUE EXAM BY PATHOLOGIST: CPT | Mod: 26 | Performed by: PATHOLOGY

## 2024-10-04 PROCEDURE — 19301 PARTIAL MASTECTOMY: CPT

## 2024-10-04 PROCEDURE — 272N000001 HC OR GENERAL SUPPLY STERILE: Performed by: SURGERY

## 2024-10-04 PROCEDURE — 360N000082 HC SURGERY LEVEL 2 W/ FLUORO, PER MIN: Performed by: SURGERY

## 2024-10-04 PROCEDURE — 88307 TISSUE EXAM BY PATHOLOGIST: CPT | Mod: TC | Performed by: SURGERY

## 2024-10-04 PROCEDURE — 250N000025 HC SEVOFLURANE, PER MIN: Performed by: SURGERY

## 2024-10-04 PROCEDURE — 250N000009 HC RX 250: Performed by: SURGERY

## 2024-10-04 PROCEDURE — 19301 PARTIAL MASTECTOMY: CPT | Mod: 58 | Performed by: SURGERY

## 2024-10-04 PROCEDURE — 19301 PARTIAL MASTECTOMY: CPT | Mod: AS | Performed by: PHYSICIAN ASSISTANT

## 2024-10-04 PROCEDURE — 250N000013 HC RX MED GY IP 250 OP 250 PS 637: Performed by: PHYSICIAN ASSISTANT

## 2024-10-04 PROCEDURE — 250N000011 HC RX IP 250 OP 636

## 2024-10-04 PROCEDURE — 710N000012 HC RECOVERY PHASE 2, PER MINUTE: Performed by: SURGERY

## 2024-10-04 RX ORDER — HYDROMORPHONE HCL IN WATER/PF 6 MG/30 ML
0.4 PATIENT CONTROLLED ANALGESIA SYRINGE INTRAVENOUS EVERY 5 MIN PRN
Status: DISCONTINUED | OUTPATIENT
Start: 2024-10-04 | End: 2024-10-04 | Stop reason: HOSPADM

## 2024-10-04 RX ORDER — FENTANYL CITRATE 50 UG/ML
25 INJECTION, SOLUTION INTRAMUSCULAR; INTRAVENOUS EVERY 5 MIN PRN
Status: DISCONTINUED | OUTPATIENT
Start: 2024-10-04 | End: 2024-10-04 | Stop reason: HOSPADM

## 2024-10-04 RX ORDER — NALOXONE HYDROCHLORIDE 0.4 MG/ML
0.1 INJECTION, SOLUTION INTRAMUSCULAR; INTRAVENOUS; SUBCUTANEOUS
Status: DISCONTINUED | OUTPATIENT
Start: 2024-10-04 | End: 2024-10-04 | Stop reason: HOSPADM

## 2024-10-04 RX ORDER — ONDANSETRON 4 MG/1
4 TABLET, ORALLY DISINTEGRATING ORAL EVERY 30 MIN PRN
Status: DISCONTINUED | OUTPATIENT
Start: 2024-10-04 | End: 2024-10-04 | Stop reason: HOSPADM

## 2024-10-04 RX ORDER — BUPIVACAINE HYDROCHLORIDE 5 MG/ML
INJECTION, SOLUTION EPIDURAL; INTRACAUDAL
Status: DISCONTINUED
Start: 2024-10-04 | End: 2024-10-04 | Stop reason: HOSPADM

## 2024-10-04 RX ORDER — SODIUM CHLORIDE, SODIUM LACTATE, POTASSIUM CHLORIDE, CALCIUM CHLORIDE 600; 310; 30; 20 MG/100ML; MG/100ML; MG/100ML; MG/100ML
INJECTION, SOLUTION INTRAVENOUS CONTINUOUS PRN
Status: DISCONTINUED | OUTPATIENT
Start: 2024-10-04 | End: 2024-10-04

## 2024-10-04 RX ORDER — DEXAMETHASONE SODIUM PHOSPHATE 4 MG/ML
4 INJECTION, SOLUTION INTRA-ARTICULAR; INTRALESIONAL; INTRAMUSCULAR; INTRAVENOUS; SOFT TISSUE
Status: DISCONTINUED | OUTPATIENT
Start: 2024-10-04 | End: 2024-10-04 | Stop reason: HOSPADM

## 2024-10-04 RX ORDER — HYDROCODONE BITARTRATE AND ACETAMINOPHEN 5; 325 MG/1; MG/1
1 TABLET ORAL
Status: COMPLETED | OUTPATIENT
Start: 2024-10-04 | End: 2024-10-04

## 2024-10-04 RX ORDER — FENTANYL CITRATE 50 UG/ML
50 INJECTION, SOLUTION INTRAMUSCULAR; INTRAVENOUS EVERY 5 MIN PRN
Status: DISCONTINUED | OUTPATIENT
Start: 2024-10-04 | End: 2024-10-04 | Stop reason: HOSPADM

## 2024-10-04 RX ORDER — CEFAZOLIN SODIUM/WATER 2 G/20 ML
SYRINGE (ML) INTRAVENOUS PRN
Status: DISCONTINUED | OUTPATIENT
Start: 2024-10-04 | End: 2024-10-04

## 2024-10-04 RX ORDER — FENTANYL CITRATE 50 UG/ML
INJECTION, SOLUTION INTRAMUSCULAR; INTRAVENOUS PRN
Status: DISCONTINUED | OUTPATIENT
Start: 2024-10-04 | End: 2024-10-04

## 2024-10-04 RX ORDER — LIDOCAINE HYDROCHLORIDE 10 MG/ML
INJECTION, SOLUTION EPIDURAL; INFILTRATION; INTRACAUDAL; PERINEURAL
Status: DISCONTINUED
Start: 2024-10-04 | End: 2024-10-04 | Stop reason: HOSPADM

## 2024-10-04 RX ORDER — BUPIVACAINE HYDROCHLORIDE 5 MG/ML
INJECTION, SOLUTION PERINEURAL PRN
Status: DISCONTINUED | OUTPATIENT
Start: 2024-10-04 | End: 2024-10-04 | Stop reason: HOSPADM

## 2024-10-04 RX ORDER — PROPOFOL 10 MG/ML
INJECTION, EMULSION INTRAVENOUS PRN
Status: DISCONTINUED | OUTPATIENT
Start: 2024-10-04 | End: 2024-10-04

## 2024-10-04 RX ORDER — CEFAZOLIN SODIUM/WATER 2 G/20 ML
2 SYRINGE (ML) INTRAVENOUS SEE ADMIN INSTRUCTIONS
Status: DISCONTINUED | OUTPATIENT
Start: 2024-10-04 | End: 2024-10-04 | Stop reason: HOSPADM

## 2024-10-04 RX ORDER — LABETALOL HYDROCHLORIDE 5 MG/ML
10 INJECTION, SOLUTION INTRAVENOUS
Status: DISCONTINUED | OUTPATIENT
Start: 2024-10-04 | End: 2024-10-04 | Stop reason: HOSPADM

## 2024-10-04 RX ORDER — HYDROCODONE BITARTRATE AND ACETAMINOPHEN 5; 325 MG/1; MG/1
1-2 TABLET ORAL EVERY 4 HOURS PRN
Qty: 10 TABLET | Refills: 0 | Status: SHIPPED | OUTPATIENT
Start: 2024-10-04 | End: 2024-10-10

## 2024-10-04 RX ORDER — CEFAZOLIN SODIUM/WATER 2 G/20 ML
2 SYRINGE (ML) INTRAVENOUS
Status: DISCONTINUED | OUTPATIENT
Start: 2024-10-04 | End: 2024-10-04 | Stop reason: HOSPADM

## 2024-10-04 RX ORDER — LIDOCAINE HYDROCHLORIDE 20 MG/ML
INJECTION, SOLUTION INFILTRATION; PERINEURAL PRN
Status: DISCONTINUED | OUTPATIENT
Start: 2024-10-04 | End: 2024-10-04

## 2024-10-04 RX ORDER — HYDRALAZINE HYDROCHLORIDE 20 MG/ML
2.5-5 INJECTION INTRAMUSCULAR; INTRAVENOUS EVERY 10 MIN PRN
Status: DISCONTINUED | OUTPATIENT
Start: 2024-10-04 | End: 2024-10-04 | Stop reason: HOSPADM

## 2024-10-04 RX ORDER — HYDROXYZINE HYDROCHLORIDE 25 MG/1
25 TABLET, FILM COATED ORAL EVERY 6 HOURS PRN
Status: DISCONTINUED | OUTPATIENT
Start: 2024-10-04 | End: 2024-10-04 | Stop reason: HOSPADM

## 2024-10-04 RX ORDER — ONDANSETRON 2 MG/ML
4 INJECTION INTRAMUSCULAR; INTRAVENOUS EVERY 30 MIN PRN
Status: DISCONTINUED | OUTPATIENT
Start: 2024-10-04 | End: 2024-10-04 | Stop reason: HOSPADM

## 2024-10-04 RX ORDER — ACETAMINOPHEN 325 MG/1
975 TABLET ORAL ONCE
Status: DISCONTINUED | OUTPATIENT
Start: 2024-10-04 | End: 2024-10-04 | Stop reason: HOSPADM

## 2024-10-04 RX ORDER — HYDROMORPHONE HCL IN WATER/PF 6 MG/30 ML
0.2 PATIENT CONTROLLED ANALGESIA SYRINGE INTRAVENOUS EVERY 5 MIN PRN
Status: DISCONTINUED | OUTPATIENT
Start: 2024-10-04 | End: 2024-10-04 | Stop reason: HOSPADM

## 2024-10-04 RX ORDER — ONDANSETRON 2 MG/ML
INJECTION INTRAMUSCULAR; INTRAVENOUS PRN
Status: DISCONTINUED | OUTPATIENT
Start: 2024-10-04 | End: 2024-10-04

## 2024-10-04 RX ORDER — MAGNESIUM HYDROXIDE 1200 MG/15ML
LIQUID ORAL PRN
Status: DISCONTINUED | OUTPATIENT
Start: 2024-10-04 | End: 2024-10-04 | Stop reason: HOSPADM

## 2024-10-04 RX ORDER — PROPOFOL 10 MG/ML
INJECTION, EMULSION INTRAVENOUS CONTINUOUS PRN
Status: DISCONTINUED | OUTPATIENT
Start: 2024-10-04 | End: 2024-10-04

## 2024-10-04 RX ORDER — SODIUM CHLORIDE, SODIUM LACTATE, POTASSIUM CHLORIDE, CALCIUM CHLORIDE 600; 310; 30; 20 MG/100ML; MG/100ML; MG/100ML; MG/100ML
INJECTION, SOLUTION INTRAVENOUS CONTINUOUS
Status: DISCONTINUED | OUTPATIENT
Start: 2024-10-04 | End: 2024-10-04 | Stop reason: HOSPADM

## 2024-10-04 RX ORDER — DEXAMETHASONE SODIUM PHOSPHATE 4 MG/ML
INJECTION, SOLUTION INTRA-ARTICULAR; INTRALESIONAL; INTRAMUSCULAR; INTRAVENOUS; SOFT TISSUE PRN
Status: DISCONTINUED | OUTPATIENT
Start: 2024-10-04 | End: 2024-10-04

## 2024-10-04 RX ADMIN — PROPOFOL 200 MCG/KG/MIN: 10 INJECTION, EMULSION INTRAVENOUS at 11:48

## 2024-10-04 RX ADMIN — FENTANYL CITRATE 25 MCG: 50 INJECTION, SOLUTION INTRAMUSCULAR; INTRAVENOUS at 12:55

## 2024-10-04 RX ADMIN — DEXAMETHASONE SODIUM PHOSPHATE 4 MG: 4 INJECTION, SOLUTION INTRA-ARTICULAR; INTRALESIONAL; INTRAMUSCULAR; INTRAVENOUS; SOFT TISSUE at 11:47

## 2024-10-04 RX ADMIN — MIDAZOLAM 2 MG: 1 INJECTION INTRAMUSCULAR; INTRAVENOUS at 11:37

## 2024-10-04 RX ADMIN — ONDANSETRON 4 MG: 2 INJECTION INTRAMUSCULAR; INTRAVENOUS at 12:11

## 2024-10-04 RX ADMIN — PHENYLEPHRINE HYDROCHLORIDE 100 MCG: 10 INJECTION INTRAVENOUS at 12:05

## 2024-10-04 RX ADMIN — PHENYLEPHRINE HYDROCHLORIDE 100 MCG: 10 INJECTION INTRAVENOUS at 12:11

## 2024-10-04 RX ADMIN — Medication 2 G: at 11:47

## 2024-10-04 RX ADMIN — FENTANYL CITRATE 50 MCG: 50 INJECTION INTRAMUSCULAR; INTRAVENOUS at 11:47

## 2024-10-04 RX ADMIN — PROPOFOL 150 MG: 10 INJECTION, EMULSION INTRAVENOUS at 11:47

## 2024-10-04 RX ADMIN — LIDOCAINE HYDROCHLORIDE 100 MG: 20 INJECTION, SOLUTION INFILTRATION; PERINEURAL at 11:37

## 2024-10-04 RX ADMIN — PHENYLEPHRINE HYDROCHLORIDE 100 MCG: 10 INJECTION INTRAVENOUS at 11:55

## 2024-10-04 RX ADMIN — FENTANYL CITRATE 50 MCG: 50 INJECTION INTRAMUSCULAR; INTRAVENOUS at 11:59

## 2024-10-04 RX ADMIN — PHENYLEPHRINE HYDROCHLORIDE 100 MCG: 10 INJECTION INTRAVENOUS at 11:57

## 2024-10-04 RX ADMIN — HYDROCODONE BITARTRATE AND ACETAMINOPHEN 1 TABLET: 5; 325 TABLET ORAL at 13:54

## 2024-10-04 RX ADMIN — SODIUM CHLORIDE, POTASSIUM CHLORIDE, SODIUM LACTATE AND CALCIUM CHLORIDE: 600; 310; 30; 20 INJECTION, SOLUTION INTRAVENOUS at 11:37

## 2024-10-04 RX ADMIN — PHENYLEPHRINE HYDROCHLORIDE 100 MCG: 10 INJECTION INTRAVENOUS at 12:17

## 2024-10-04 ASSESSMENT — ACTIVITIES OF DAILY LIVING (ADL)
ADLS_ACUITY_SCORE: 35
ADLS_ACUITY_SCORE: 35
ADLS_ACUITY_SCORE: 33
ADLS_ACUITY_SCORE: 35
ADLS_ACUITY_SCORE: 35

## 2024-10-04 ASSESSMENT — LIFESTYLE VARIABLES: TOBACCO_USE: 0

## 2024-10-04 NOTE — BRIEF OP NOTE
[facility]    Brief Operative Note    Pre-operative diagnosis: Invasive ductal carcinoma of breast, female, right (H) [C50.911]  Post-operative diagnosis right breast cancer, positive margin S/P lumpectomy    Procedure: positive margin re-excision right breast, Right - Breast    Surgeon: Surgeons and Role:     * Geoff Hurst MD - Primary     * Ivan Campbell PA-C - Assisting    Anesthesia: MAC with Local   Estimated Blood Loss: Less than 10 ml    Drains: None  Specimens:   ID Type Source Tests Collected by Time Destination   1 : Inferior Lumpectomy Margin Tissue Breast, Right SURGICAL PATHOLOGY EXAM Geoff Hurst MD 10/4/2024 12:04 PM      Findings:   See Operative report.  20mL hematoma .  Complications: None.  Implants: * No implants in log *        Romeo Campbell PA-C  [phone]

## 2024-10-04 NOTE — ANESTHESIA CARE TRANSFER NOTE
Patient: Giuliana Cameron    Procedure: Procedure(s):  positive margin re-excision right breast       Diagnosis: Invasive ductal carcinoma of breast, female, right (H) [C50.911]  Diagnosis Additional Information: No value filed.    Anesthesia Type:   General     Note:    Oropharynx: oropharynx clear of all foreign objects and spontaneously breathing  Level of Consciousness: awake  Oxygen Supplementation: face mask  Level of Supplemental Oxygen (L/min / FiO2): 6  Independent Airway: airway patency satisfactory and stable  Dentition: dentition unchanged  Vital Signs Stable: post-procedure vital signs reviewed and stable  Report to RN Given: handoff report given  Patient transferred to: Phase II    Handoff Report: Identifed the Patient, Identified the Reponsible Provider, Reviewed the pertinent medical history, Discussed the surgical course, Reviewed Intra-OP anesthesia mangement and issues during anesthesia, Set expectations for post-procedure period and Allowed opportunity for questions and acknowledgement of understanding      Vitals:  Vitals Value Taken Time   /66 10/04/24 1236   Temp     Pulse 64 10/04/24 1236   Resp 28 10/04/24 1236   SpO2 100 % 10/04/24 1237   Vitals shown include unfiled device data.    Electronically Signed By: JOHN Guallpa CRNA  October 4, 2024  12:38 PM

## 2024-10-04 NOTE — ANESTHESIA PROCEDURE NOTES
Airway       Patient location during procedure: OR  Staff -        Anesthesiologist:  Jeanette Garcia MD       CRNA: zAra Davis APRN CRNA       Performed By: CRNA  Consent for Airway        Urgency: elective  Indications and Patient Condition       Indications for airway management: amaya-procedural       Induction type:intravenous       Mask difficulty assessment: 0 - not attempted    Final Airway Details       Final airway type: supraglottic airway    Supraglottic Airway Details        Type: LMA       Brand: Ambu AuraGain       LMA size: 4    Post intubation assessment        Placement verified by: capnometry and chest rise        Number of attempts at approach: 1       Number of other approaches attempted: 0       Ease of procedure: easy       Dentition: Intact and Unchanged

## 2024-10-04 NOTE — OR NURSING
Patient would like food from home before taking a pain pill post operatively, due to nausea after last procedure.

## 2024-10-04 NOTE — OR NURSING
Pt declined pain medication until  here with rice from home-  here- food eaten and administered one norco pain pill- instructions w pt and - verbalized understanding, denies questions- pt wants to wait here until sure no nausea- will inform this Nurse when ready to go home.

## 2024-10-04 NOTE — DISCHARGE INSTRUCTIONS
Same Day Surgery Discharge Instructions for  Sedation and General Anesthesia     It's not unusual to feel dizzy, light-headed or faint for up to 24 hours after surgery or while taking pain medication.  If you have these symptoms: sit for a few minutes before standing and have someone assist you when you get up to walk or use the bathroom.    You should rest and relax for the next 24 hours. We recommend you make arrangements to have an adult stay with you for at least 24 hours after your discharge.  Avoid hazardous and strenuous activity.    DO NOT DRIVE any vehicle or operate mechanical equipment for 24 hours following the end of your surgery.  Even though you may feel normal, your reactions may be affected by the medication you have received.    Do not drink alcoholic beverages for 24 hours following surgery.     Slowly progress to your regular diet as you feel able. It's not unusual to feel nauseated and/or vomit after receiving anesthesia.  If you develop these symptoms, drink clear liquids (apple juice, ginger ale, broth, 7-up, etc. ) until you feel better.  If your nausea and vomiting persists for 24 hours, please notify your surgeon.      All narcotic pain medications, along with inactivity and anesthesia, can cause constipation. Drinking plenty of liquids and increasing fiber intake will help.    For any questions of a medical nature, call your surgeon.    Do not make important decisions for 24 hours.    If you had general anesthesia, you may have a sore throat for a couple of days related to the breathing tube used during surgery.  You may use Cepacol lozenges to help with this discomfort.  If it worsens or if you develop a fever, contact your surgeon.     If you feel your pain is not well managed with the pain medications prescribed by your surgeon, please contact your surgeon's office to let them know so they can address your concerns.     **If you have concerns or questions about your procedure,     please contact Dr Hurst at  [phone]**      [facility]  Discharge Instructions: Post-Operative Breast Surgery    ACTIVITY  Take frequent short walks and increase your activity gradually.    Avoid strenuous physical activity or heavy lifting greater than 15-20 lbs. for 1-2 weeks with arm on the surgery side.  You may climb stairs.  Gentle rotation and stretching of your arms and shoulders will prevent joint stiffness.  You may drive without restrictions when you are not using any prescription pain medication and feel comfortable in a car.  You may return to work/school when you are comfortable without any prescription pain medication.    WOUND CARE  You may remove your ACE wrap/dressing and shower 48 hours after the surgery.  Pat your incisions dry and leave them open to air.  Re-apply dressing (Band-Aids or gauze/tape) as needed for drainage.  You may have steri-strips (looks like white tape) or skin glue on your incisions.  You may peel off the steri-strips 2 weeks after your surgery if they have not peeled off on their own.  If you have skin glue, it will peel up and fall off on its own.  Do not soak your incisions in a tub or pool for 2 weeks.   Do not apply any lotions, creams, or ointments to your incisions.  A ridge under your incisions is normal and will gradually resolve.  Wear a supportive bra for 1-2 weeks, day and night.    DIET  Start with liquids, then gradually resume your regular diet as tolerated.   Drink plenty of liquids to stay hydrated.    PAIN  Expect some tenderness and discomfort at the incision site(s).  Use the prescribed pain medication at your discretion.  Expect gradual resolution of your pain over several days.  You may take ibuprofen with food (unless you have been told not to) or acetaminophen/Tylenol instead of or in addition to your prescribed pain medication.  However, if you are taking Norco or Percocet, do not take any additional  acetaminophen/Tylenol.  Do not drink alcohol or drive while you are taking pain medications.    EXPECTATIONS  Pain medications can cause constipation.  Limit use when possible.  Take an over the counter or prescribed stool softener/stimulant, such as Colace or Senna, 1-2 times a day with plenty of water.  You may take a mild over the counter laxative, such as Miralax or a suppository, as needed.    You may discontinue these medications once you are having regular bowel movements and/or are no longer taking your narcotic pain medication.    RETURN APPOINTMENT  Follow up with your surgeon in 2-4 weeks.  Please call the office at [phone] to schedule your appointment.      CALL OUR OFFICE -[phone] IF YOU HAVE:   Chills or fever above 101 F.  Increased redness, warmth, or drainage at your incisions.  Significant bleeding.  Pain not relieved by your pain medication or rest.  Increasing pain after the first 48 hours.  Any other concerns or questions.             Revised October 2022

## 2024-10-04 NOTE — PROVIDER NOTIFICATION
Pt has plenty of Norco medications from previous surgeries.  Pt denies needing a refill Signed paper script from today thrown in shredder.

## 2024-10-04 NOTE — ANESTHESIA PREPROCEDURE EVALUATION
Anesthesia Pre-Procedure Evaluation    Patient: Giuliana Cameron   MRN: [id number] : 1970        Procedure : Procedure(s):  positive margin re-excision right breast          Past Medical History:   Diagnosis Date    Dyspepsia and other specified disorders of function of stomach 2003    pos H.pylori tx'ed    PONV (postoperative nausea and vomiting)     Vitamin D deficiencies 2011      Past Surgical History:   Procedure Laterality Date    BIOPSY NODE SENTINEL Right 2024    Procedure: sentinel lymph node biopsy;  Surgeon: Geoff Hurst MD;  Location:  OR    LAPAROSCOPIC CHOLECYSTECTOMY  2004    Cholecystectomy, Laparoscopic    LUMPECTOMY, BREAST, LOCALIZED USING RADIOFREQUENCY IDENTIFICATION Right 2024    Procedure: LUMPECTOMY, BREAST, LOCALIZED USING RADIOFREQUENCY IDENTIFICATION,;  Surgeon: Geoff Hurst MD;  Location:  OR    SHOULDER SURGERY      ZZC  DELIVERY ONLY  2004    , Low Cervical      Allergies   Allergen Reactions    No Known Allergies       Social History     Tobacco Use    Smoking status: Never    Smokeless tobacco: Never   Substance Use Topics    Alcohol use: No      Wt Readings from Last 1 Encounters:   10/04/24 67.1 kg (148 lb)        Anesthesia Evaluation   Pt has had prior anesthetic.     History of anesthetic complications  - PONV.      ROS/MED HX  ENT/Pulmonary:    (-) tobacco use, asthma and sleep apnea   Neurologic:       Cardiovascular:     (+) Dyslipidemia - -   -  - -                                      METS/Exercise Tolerance:     Hematologic:       Musculoskeletal:       GI/Hepatic:     (+) GERD, Asymptomatic on medication,                  Renal/Genitourinary:       Endo:       Psychiatric/Substance Use:       Infectious Disease:       Malignancy:   (+) Malignancy, History of Breast.    Other:            Physical Exam    Airway        Mallampati: I   TM distance: > 3 FB   Neck ROM: full   Mouth opening:  "> 3 cm    Respiratory Devices and Support         Dental       (+) Completely normal teeth      Cardiovascular   cardiovascular exam normal          Pulmonary   pulmonary exam normal                OUTSIDE LABS:  CBC:   Lab Results   Component Value Date    WBC 5.1 09/19/2024    WBC 4.9 04/01/2024    HGB 13.2 09/19/2024    HGB 13.3 04/01/2024    HCT 38.5 09/19/2024    HCT 39.2 04/01/2024     09/19/2024     04/01/2024     BMP:   Lab Results   Component Value Date     09/19/2024     04/01/2024    POTASSIUM 4.5 09/19/2024    POTASSIUM 4.2 04/01/2024    CHLORIDE 105 09/19/2024    CHLORIDE 104 04/01/2024    CO2 25 09/19/2024    CO2 24 04/01/2024    BUN 11.4 09/19/2024    BUN 11.2 04/01/2024    CR 0.69 09/19/2024    CR 0.74 04/01/2024    GLC 97 09/19/2024     (H) 04/01/2024     COAGS: No results found for: \"PTT\", \"INR\", \"FIBR\"  POC:   Lab Results   Component Value Date    HCG Negative 05/18/2012     HEPATIC:   Lab Results   Component Value Date    ALBUMIN 4.5 04/01/2024    PROTTOTAL 7.5 04/01/2024    ALT 37 04/01/2024    AST 32 04/01/2024    ALKPHOS 87 04/01/2024    BILITOTAL 0.3 04/01/2024     OTHER:   Lab Results   Component Value Date    A1C 5.6 04/01/2024    REYMUNDO 9.3 09/19/2024    TSH 1.61 10/17/2022    T4 1.08 10/06/2003    CRP 7.7 05/27/2014    SED 28 (H) 05/27/2014       Anesthesia Plan    ASA Status:  2    NPO Status:  NPO Appropriate    Anesthesia Type: General.     - Airway: LMA   Induction: Intravenous.   Maintenance: TIVA.        Consents    Anesthesia Plan(s) and associated risks, benefits, and realistic alternatives discussed. Questions answered and patient/representative(s) expressed understanding.     - Discussed:     - Discussed with:  Patient            Postoperative Care    Pain management: Oral pain medications, IV analgesics, Multi-modal analgesia.   PONV prophylaxis: Ondansetron (or other 5HT-3), Dexamethasone or Solumedrol, Background Propofol Infusion " "    Comments:               Jeanette Garcia MD    I have reviewed the pertinent notes and labs in the chart from the past 30 days and (re)examined the patient.  Any updates or changes from those notes are reflected in this note.                      # Overweight: Estimated body mass index is 27.51 kg/m  as calculated from the following:    Height as of this encounter: 1.562 m (5' 1.5\").    Weight as of this encounter: 67.1 kg (148 lb).             "

## 2024-10-04 NOTE — OR NURSING
AOX3-VSS-O2 sats >92% RA- Good PO intake, pain tolerable 5/10- Pt and responsible adult verbalize understanding of discharge instructions, denies questions. Up in W/C - transported to door for discharge to home.

## 2024-10-07 NOTE — OP NOTE
General Surgery Operative Note    PREOPERATIVE DIAGNOSIS: Positive right lumpectomy margin    POSTOPERATIVE DIAGNOSIS:  Same    PROCEDURE:    positive margin re-excision right breast  ONCOPLASTIC CLOSURE    ANESTHESIA:  MAC    PREOPERATIVE MEDICATIONS:  Ancef IV    SURGEON:  Geoff Hurst MD    ASSISTANT:  Ivan Campbell PA-C  First assistant was necessary due to challenging exposure and the need for improved visualization and help maintaining hemostasis.      INDICATIONS: Patient recently underwent right lumpectomy for breast cancer.  Her inferior margin was positive for DCIS and she now presents for reexcision.  Postoperative MRI did not reveal a significant burden of residual disease.    DESCRIPTION OF PROCEDURE: The patient was taken to the operating suite and LMA was placed.  The right breast was prepped and draped in a sterile fashion. We reopened the circumareolar incision and directed our dissection toward the lumpectomy cavity.  Approximately 30 cc of blood and clot were evacuated.  We defined the inferior margin of the lumpectomy and this area was reexcised.  It was inked according to protocol.  The specimen was sent to pathology. We then mobilized the tissue around the lumpectomy, both superficially and at the level of the fascia.  This tissue was then reapproximated with 3-0 Vicryl suture to close the cavity and fill the defect.  We then irrigated out the cavity and closed the skin in layers using 3-0 and 4-0 Vicryl. At the conclusion of the case, all lap and needle counts were correct. Estimated blood loss was 5 cc.      ESTIMATED BLOOD LOSS:  15 mL    INTRAOPERATIVE FINDINGS: Small postlumpectomy hematoma, inferior margin reexcised.    Geoff Hurst MD

## 2024-10-08 ENCOUNTER — MYC MEDICAL ADVICE (OUTPATIENT)
Dept: MAMMOGRAPHY | Facility: CLINIC | Age: 54
End: 2024-10-08

## 2024-10-08 LAB
PATH REPORT.COMMENTS IMP SPEC: ABNORMAL
PATH REPORT.COMMENTS IMP SPEC: ABNORMAL
PATH REPORT.COMMENTS IMP SPEC: YES
PATH REPORT.FINAL DX SPEC: ABNORMAL
PATH REPORT.GROSS SPEC: ABNORMAL
PATH REPORT.MICROSCOPIC SPEC OTHER STN: ABNORMAL
PATH REPORT.RELEVANT HX SPEC: ABNORMAL
PHOTO IMAGE: ABNORMAL

## 2024-10-08 NOTE — RESULT ENCOUNTER NOTE
I have already discussed the results with the patient.  Can we get her scheduled to meet with radiation oncology?

## 2024-10-10 ENCOUNTER — PRE VISIT (OUTPATIENT)
Dept: ONCOLOGY | Facility: CLINIC | Age: 54
End: 2024-10-10
Payer: COMMERCIAL

## 2024-10-10 ENCOUNTER — ONCOLOGY VISIT (OUTPATIENT)
Dept: ONCOLOGY | Facility: CLINIC | Age: 54
End: 2024-10-10
Attending: PHYSICIAN ASSISTANT
Payer: COMMERCIAL

## 2024-10-10 VITALS
BODY MASS INDEX: 27.4 KG/M2 | HEART RATE: 75 BPM | RESPIRATION RATE: 16 BRPM | WEIGHT: 147.4 LBS | TEMPERATURE: 97.6 F | SYSTOLIC BLOOD PRESSURE: 105 MMHG | DIASTOLIC BLOOD PRESSURE: 71 MMHG | OXYGEN SATURATION: 99 %

## 2024-10-10 DIAGNOSIS — C50.211 MALIGNANT NEOPLASM OF UPPER-INNER QUADRANT OF RIGHT BREAST IN FEMALE, ESTROGEN RECEPTOR POSITIVE (H): ICD-10-CM

## 2024-10-10 DIAGNOSIS — Z17.0 MALIGNANT NEOPLASM OF UPPER-INNER QUADRANT OF RIGHT BREAST IN FEMALE, ESTROGEN RECEPTOR POSITIVE (H): ICD-10-CM

## 2024-10-10 DIAGNOSIS — M85.9 DISORDER OF BONE DENSITY AND STRUCTURE, UNSPECIFIED: Primary | ICD-10-CM

## 2024-10-10 PROCEDURE — 99213 OFFICE O/P EST LOW 20 MIN: CPT | Performed by: INTERNAL MEDICINE

## 2024-10-10 PROCEDURE — G2211 COMPLEX E/M VISIT ADD ON: HCPCS | Performed by: INTERNAL MEDICINE

## 2024-10-10 PROCEDURE — 99205 OFFICE O/P NEW HI 60 MIN: CPT | Performed by: INTERNAL MEDICINE

## 2024-10-10 ASSESSMENT — PAIN SCALES - GENERAL: PAINLEVEL: MILD PAIN (2)

## 2024-10-10 NOTE — NURSING NOTE
"Oncology Rooming Note    October 10, 2024 11:11 AM   Giuliana Cameron is a 54 year old female who presents for:    Chief Complaint   Patient presents with    Oncology Clinic Visit     Initial Vitals: /71   Pulse 75   Temp 97.6  F (36.4  C) (Oral)   Resp 16   Wt 66.9 kg (147 lb 6.4 oz)   LMP 06/20/2017 (Approximate)   SpO2 99%   BMI 27.40 kg/m   Estimated body mass index is 27.4 kg/m  as calculated from the following:    Height as of 10/4/24: 1.562 m (5' 1.5\").    Weight as of this encounter: 66.9 kg (147 lb 6.4 oz). Body surface area is 1.7 meters squared.  Mild Pain (2) Comment: Data Unavailable   Patient's last menstrual period was 06/20/2017 (approximate).  Allergies reviewed: Yes  Medications reviewed: Yes    Medications: Medication refills not needed today.  Pharmacy name entered into [employer]:    [id number] - [place], [place] - [address] [facility] - [place] [address]  [id number] - [place], [place] - [address] [facility] [place] - [address]    Frailty Screening:   Is the patient here for a new oncology consult visit in cancer care? 2. No      Clinical concerns: Omar Gutierrez"

## 2024-10-10 NOTE — LETTER
10/10/2024      Giuliana Cameron  [address] [facility]  [place] [address]      Dear Colleague,    Thank you for referring your patient, Giuliana Cameron, to the [facility]. Please see a copy of my visit note below.    [facility]    Hematology/Oncology New Patient Note      Today's Date: 10/10/24    Reason for visit: Right breast cancer.    HISTORY OF PRESENT ILLNESS: Giuliana Cameron is a 54 year old female who presents with the following oncologic history:  1.  8/28/2024: Screening mammogram showed mass in upper inner right breast.  Left breast negative.  2.  9/4/2024: Right breast ultrasound showed 1.6 x 1.1 x 1 cm mass at 1:00, 8 cm from nipple.  Right axillary ultrasound showed normal-appearing nodes.  3.  9/4/2024: Right breast biopsy at 1:00 showed grade 1 invasive ductal carcinoma measuring at least 9 mm in greatest linear extent, no LVI, grade 2 DCIS present, ER strongly positive at 100%, [place] strongly positive at 100%, HER2 negative with IHC = 1+.  4.  9/23/2024: Right breast lumpectomy with right axillary sentinel lymph node excision under care of Dr. Geoff Hurst.  Pathology showed 1.6 x 1.2 x 1 cm grade 1 invasive ductal carcinoma, grade 2 DCIS estimated at 3.9 cm with invasive carcinoma located less than 0.1 cm from closest inferior margin and DCIS located less than 0.1 cm from closest posterior margin.  Total 1 right axillary sentinel lymph node negative for carcinoma.  Reexcision of inferior margin showed multiple foci of DCIS present at new margin. Oncotype DX pending.  5.  9/30/2024: Breast MRI showed hematoma at right upper inner breast spanning 8 cm and surrounding non-mass like enhancement reflecting postoperative changes.  No right axillary or right internal mammary chain lymphadenopathy.  Left breast negative.  6.  10/4/2024: Reexcision of positive margin in right breast.  Pathology showed residual foci of grade 2 DCIS, microscopic  foci of DCIS ranging from 0.1 mm to 0.8 mm, DCIS is 1.3 mm from new inferior margin.    Giuliana reports she is postmenopausal and has not had a menstrual period since her late 40's.  She has had some discomfort at the right upper inner breast after her re-excision.  She is not requiring pain medication.  She had hot flashes when she initially entered menopause but none now.    REVIEW OF SYSTEMS:   14 point ROS was reviewed and is negative other than as noted above in HPI.       HOME MEDICATIONS:  Current Outpatient Medications   Medication Sig Dispense Refill     albuterol (PROAIR HFA/PROVENTIL HFA/VENTOLIN HFA) 108 (90 Base) MCG/ACT inhaler Inhale 2 puffs into the lungs every 6 hours as needed for shortness of breath, wheezing or cough. 18 g 1     fish oil-omega-3 fatty acids 1000 MG capsule Take 2 g by mouth daily       HYDROcodone-acetaminophen (NORCO) 5-325 MG tablet Take 1-2 tablets by mouth every 4 hours as needed for moderate to severe pain. 10 tablet 0     HYDROcodone-acetaminophen (NORCO) 5-325 MG tablet Take 1 tablet by mouth every 4 hours as needed for moderate to severe pain. 8 tablet 0     multivitamins w/minerals liquid        senna-docusate (SENOKOT-S/PERICOLACE) 8.6-50 MG tablet Take 1-2 tablets by mouth 2 times daily as needed for constipation (while taking norco). 20 tablet 0         ALLERGIES:  Allergies   Allergen Reactions     No Known Allergies          PAST MEDICAL HISTORY:  Past Medical History:   Diagnosis Date     Dyspepsia and other specified disorders of function of stomach 01/01/2003    pos H.pylori tx'ed     PONV (postoperative nausea and vomiting)      Vitamin D deficiencies 01/01/2011   Did not use IUD, OCPs, or HRT.      PAST SURGICAL HISTORY:  Past Surgical History:   Procedure Laterality Date     BIOPSY NODE SENTINEL Right 9/23/2024    Procedure: sentinel lymph node biopsy;  Surgeon: Geoff Hurst MD;  Location: [facility] OR     LAPAROSCOPIC CHOLECYSTECTOMY  06/01/2004     Cholecystectomy, Laparoscopic     LUMPECTOMY, BREAST, LOCALIZED USING RADIOFREQUENCY IDENTIFICATION Right 2024    Procedure: LUMPECTOMY, BREAST, LOCALIZED USING RADIOFREQUENCY IDENTIFICATION,;  Surgeon: Geoff Hurst MD;  Location: [facility] OR     SHOULDER SURGERY       [facility]  DELIVERY ONLY  2004    , Low Cervical   Right shoulder surgery.      SOCIAL HISTORY:  Social History     Socioeconomic History     Marital status:      Spouse name: Not on file     Number of children: 2     Years of education: Not on file     Highest education level: Not on file   Occupational History     Occupation:      Employer: [employer]   Tobacco Use     Smoking status: Never     Smokeless tobacco: Never   Vaping Use     Vaping status: Never Used   Substance and Sexual Activity     Alcohol use: No     Drug use: No     Sexual activity: Yes     Partners: Male     Birth control/protection: Condom   Other Topics Concern      Service No     Blood Transfusions No     Caffeine Concern No     Occupational Exposure No     Hobby Hazards No     Sleep Concern No     Stress Concern Yes     Weight Concern Yes     Special Diet No     Back Care No     Exercise Yes     Comment: aerobics, treadmill, weights     Bike Helmet Yes     Seat Belt Yes     Self-Exams No     Parent/sibling w/ CABG, MI or angioplasty before 65F 55M? Not Asked   Social History Narrative    , 2 kids, non smoker, alcohol none, working full time      Social Determinants of Health     Financial Resource Strain: Low Risk  (2024)    Financial Resource Strain      Within the past 12 months, have you or your family members you live with been unable to get utilities (heat, electricity) when it was really needed?: No   Food Insecurity: Low Risk  (2024)    Food Insecurity      Within the past 12 months, did you worry that your food would run out before you got money to buy more?: No      Within the past 12 months, did  the food you bought just not last and you didn t have money to get more?: No   Transportation Needs: Low Risk  (2024)    Transportation Needs      Within the past 12 months, has lack of transportation kept you from medical appointments, getting your medicines, non-medical meetings or appointments, work, or from getting things that you need?: No   Physical Activity: Insufficiently Active (2024)    Exercise Vital Sign      Days of Exercise per Week: 3 days      Minutes of Exercise per Session: 30 min   Stress: No Stress Concern Present (2024)    [place] of [employer] - Occupational Stress Questionnaire      Feeling of Stress : Only a little   Social Connections: Unknown (2024)    Social Connection and Isolation Panel [NHANES]      Frequency of Communication with Friends and Family: Not on file      Frequency of Social Gatherings with Friends and Family: Once a week      Attends Quaker Services: Not on file      Active Member of Clubs or Organizations: Not on file      Attends Club or Organization Meetings: Not on file      Marital Status: Not on file   Interpersonal Safety: Low Risk  (10/4/2024)    Interpersonal Safety      Do you feel physically and emotionally safe where you currently live?: Yes      Within the past 12 months, have you been hit, slapped, kicked or otherwise physically hurt by someone?: No      Within the past 12 months, have you been humiliated or emotionally abused in other ways by your partner or ex-partner?: No   Housing Stability: Low Risk  (2024)    Housing Stability      Do you have housing? : Yes      Are you worried about losing your housing?: No         FAMILY HISTORY:  Family History   Problem Relation Age of Onset     Diabetes Mother      Hypertension Mother      Diabetes Father      Hypertension Father      C.A.D. Father          of MI at age of 70     Breast Cancer Maternal Grandmother         at age 65+, unsure ?     Breast Cancer Paternal  Grandmother         55+     Cancer - colorectal No family hx of          PHYSICAL EXAM:  Vital signs:  /71   Pulse 75   Temp 97.6  F (36.4  C) (Oral)   Resp 16   Wt 66.9 kg (147 lb 6.4 oz)   LMP 2017 (Approximate)   SpO2 99%   BMI 27.40 kg/m     ECO  GENERAL/CONSTITUTIONAL: No acute distress.  EYES:  No scleral icterus.  ENT/MOUTH: Neck supple. Oropharynx grossly clear.  LYMPH: No cervical, supraclavicular, axillary adenopathy.  BREAST: Right upper and central breast edema and firmness from hematoma with steristrips over lumpectomy incision and mild ecchymosis. No masses in left breast.  Nipples are everted bilaterally with no discharge. No erythema, ulceration, or dimpling of the skin.    RESPIRATORY: No audible cough or wheezing.   GASTROINTESTINAL: No hepatosplenomegaly, masses, or tenderness.  No guarding.  No distention.  MUSCULOSKELETAL: Warm and well-perfused, no cyanosis, clubbing, or edema.  NEUROLOGIC: No focal motor deficits. Alert, oriented, answers questions appropriately.  INTEGUMENTARY: No rashes or jaundice.  GAIT: Steady, does not use assistive device      LABS:  CBC RESULTS:   Recent Labs   Lab Test 24  0951   WBC 5.1   RBC 4.58   HGB 13.2   HCT 38.5   MCV 84   MCH 28.8   MCHC 34.3   RDW 12.8          Recent Labs   Lab Test 24  0951 24  0858    138   POTASSIUM 4.5 4.2   CHLORIDE 105 104   CO2 25 24   ANIONGAP 9 10   GLC 97 100*   BUN 11.4 11.2   CR 0.69 0.74   REYMUNDO 9.3 9.5         PATHOLOGY:  Reviewed as per HPI.    IMAGING:  Reviewed as per HPI.    ASSESSMENT/PLAN:  Giuliana Cameron is a 54 year old female with the following issues:  1.  Stage IA, iT1r-G1-G4, grade 1 invasive ductal carcinoma of right upper inner breast, ER positive, VT positive, HER2 negative, Oncotype DX pending  -I reviewed Giuliana's breast imaging, initial biopsy pathology, final pathology from her right breast lumpectomy as well as reexcision.  She has a prognostically  favorable low-grade invasive ductal carcinoma of the right breast with no lymph node involvement that is strongly ER/OH positive, HER2 negative.  - Discussed Oncotype DX assay is pending to assess predictive benefit of chemotherapy and distant recurrence risk.  -Discussed adjuvant radiation therapy to reduce risk of local recurrence and consider adjuvant endocrine therapy for 5 years to reduce risk of recurrence by relative 50%.  Discussed options of tamoxifen versus anastrozole.  Would favor the latter.  She is also hesitant to take any medication that could raise risk of other cancers, so will not consider tamoxifen.  --I discussed the potential side effects of anastrozole, including but not limited to: fatigue, myalgias/arthralgias, bone density loss, decrease sexual drive, vaginal dryness, nausea, headache, difficulty sleeping, hot flashes, night sweats, small cardiovascular risk.   -- Had recent normal CBC and CMP back in 4/2024.  She would prefer not to undergo another blood draw today.  --Will obtain baseline DEXA scan.  --I answered questions she had regarding dietary changes and advised at least moderate cardio exercise and weight bearing exercise.    Funmi Langford MD  [facility] Hematology/Oncology    Total time spent today: 60 minutes in chart review, patient evaluation, counseling, documentation, test and/or medication/prescription orders, and coordination of care.      The longitudinal plan of care for the diagnosis(es)/condition(s) as documented were addressed during this visit. Due to the added complexity in care, I will continue to support Giuliana in the subsequent management and with ongoing continuity of care.      Again, thank you for allowing me to participate in the care of your patient.        Sincerely,        Funmi Langford MD

## 2024-10-15 ENCOUNTER — OFFICE VISIT (OUTPATIENT)
Dept: SURGERY | Facility: CLINIC | Age: 54
End: 2024-10-15
Payer: COMMERCIAL

## 2024-10-15 DIAGNOSIS — Z09 FOLLOW-UP EXAMINATION FOLLOWING SURGERY: Primary | ICD-10-CM

## 2024-10-15 PROCEDURE — 99024 POSTOP FOLLOW-UP VISIT: CPT | Performed by: SURGERY

## 2024-10-15 NOTE — LETTER
October 15, 2024          June Prieto MD  [address] [place] [address]      RE:   Giuliana Cameron 1970      Dear Colleague,    Thank you for referring your patient, Giuliana Cameron, to [facility] [place]. Please see a copy of my visit note below.    Giuliana Cameron presents today for surgical followup.  she is doing well following margin re-excision of right lumpectomy.  Incisions look fine with no signs of wound infection.  Final margins are negative.  She will follow up with med/onc when her Oncotype comes back.  She will also see rad/onc.  I expect her to make a complete recovery.     Again, thank you for allowing me to participate in the care of your patient.      Sincerely,      Geoff Hurst MD

## 2024-10-15 NOTE — PROGRESS NOTES
Surgery Postop Note    Giuliana Cameron presents today for surgical followup.  she is doing well following margin re-excision of right lumpectomy.  Incisions look fine with no signs of wound infection.  Final margins are negative.  She will follow up with med/onc when her Oncotype comes back.  She will also see rad/onc.  I expect her to make a complete recovery.  Thank you for the opportunity to help in her care.    Jalen Hurst M.D.  [facility]  [phone]    Please route or send letter to:  Primary Care Provider (PCP) and Referring Provider

## 2024-10-17 NOTE — PROGRESS NOTES
[facility]    Hematology/Oncology Established Patient Note    Giuliana is a 54 year old who is being evaluated via a billable video visit.          Video-Visit Details    Type of service:  Video Visit   Originating Location (pt. Location): Home    Distant Location (provider location):  On-site  Platform used for Video Visit: YueWell  Signed Electronically by: Funmi Langford MD    Video visit duration: 16 minutes     Today's Date: 10/10/24    Reason for visit: Right breast cancer.    HISTORY OF PRESENT ILLNESS: Giuliana Cameron is a 54 year old female who presents with the following oncologic history:  1.  8/28/2024: Screening mammogram showed mass in upper inner right breast.  Left breast negative.  2. 9/4/2024: Right breast ultrasound showed 1.6 x 1.1 x 1 cm mass at 1:00, 8 cm from nipple.  Right axillary ultrasound showed normal-appearing nodes.  3.  9/4/2024: Right breast biopsy at 1:00 showed grade 1 invasive ductal carcinoma measuring at least 9 mm in greatest linear extent, no LVI, grade 2 DCIS present, ER strongly positive at 100%, CO strongly positive at 100%, HER2 negative with IHC = 1+.  4.  9/23/2024: Right breast lumpectomy with right axillary sentinel lymph node excision under care of Dr. Geoff Hurst.  Pathology showed 1.6 x 1.2 x 1 cm grade 1 invasive ductal carcinoma, grade 2 DCIS estimated at 3.9 cm with invasive carcinoma located less than 0.1 cm from closest inferior margin and DCIS located less than 0.1 cm from closest posterior margin.  Total 1 right axillary sentinel lymph node negative for carcinoma.  Reexcision of inferior margin showed multiple foci of DCIS present at new margin. Oncotype DX pending.  5. 9/30/2024: Breast MRI showed hematoma at right upper inner breast spanning 8 cm and surrounding non-mass like enhancement reflecting postoperative changes.  No right axillary or right internal mammary chain lymphadenopathy.  Left breast negative.  6.  10/4/2024:  Reexcision of positive margin in right breast.  Pathology showed residual foci of grade 2 DCIS, microscopic foci of DCIS ranging from 0.1 mm to 0.8 mm, DCIS is 1.3 mm from new inferior margin. Oncotype DX = 9, 9-year risk of distant recurrence of 3% after 5 years of hormone blockade therapy, < 1% absolute chemo benefit.      INTERVAL HISTORY:  Giuliana reports some tenderness of the right breast but denies any erythema.  It is not throbbing.    REVIEW OF SYSTEMS:   14 point ROS was reviewed and is negative other than as noted above in HPI.       HOME MEDICATIONS:  Current Outpatient Medications   Medication Sig Dispense Refill    anastrozole (ARIMIDEX) 1 MG tablet Take 1 tablet (1 mg) by mouth daily. 90 tablet 3    multivitamins w/minerals liquid            ALLERGIES:  Allergies   Allergen Reactions    No Known Allergies          PAST MEDICAL HISTORY:  Past Medical History:   Diagnosis Date    Dyspepsia and other specified disorders of function of stomach 2003    pos H.pylori tx'ed    PONV (postoperative nausea and vomiting)     Vitamin D deficiencies 2011   Did not use IUD, OCPs, or HRT.      PAST SURGICAL HISTORY:  Past Surgical History:   Procedure Laterality Date    BIOPSY NODE SENTINEL Right 2024    Procedure: sentinel lymph node biopsy;  Surgeon: Geoff Hurst MD;  Location:  OR    LAPAROSCOPIC CHOLECYSTECTOMY  2004    Cholecystectomy, Laparoscopic    LUMPECTOMY BREAST Right 10/4/2024    Procedure: positive margin re-excision right breast;  Surgeon: Geoff Hurst MD;  Location:  OR    LUMPECTOMY, BREAST, LOCALIZED USING RADIOFREQUENCY IDENTIFICATION Right 2024    Procedure: LUMPECTOMY, BREAST, LOCALIZED USING RADIOFREQUENCY IDENTIFICATION,;  Surgeon: Geoff Hurst MD;  Location:  OR    SHOULDER SURGERY      [facility]  DELIVERY ONLY  2004    , Low Cervical   Right shoulder surgery.      SOCIAL HISTORY:  Social History     Socioeconomic  History    Marital status:      Spouse name: Not on file    Number of children: 2    Years of education: Not on file    Highest education level: Not on file   Occupational History    Occupation:      Employer: [employer]   Tobacco Use    Smoking status: Never    Smokeless tobacco: Never   Vaping Use    Vaping status: Never Used   Substance and Sexual Activity    Alcohol use: No    Drug use: No    Sexual activity: Yes     Partners: Male     Birth control/protection: Condom   Other Topics Concern     Service No    Blood Transfusions No    Caffeine Concern No    Occupational Exposure No    Hobby Hazards No    Sleep Concern No    Stress Concern Yes    Weight Concern Yes    Special Diet No    Back Care No    Exercise Yes     Comment: aerobics, treadmill, weights    Bike Helmet Yes    Seat Belt Yes    Self-Exams No    Parent/sibling w/ CABG, MI or angioplasty before 65F 55M? Not Asked   Social History Narrative    , 2 kids, non smoker, alcohol none, working full time      Social Determinants of Health     Financial Resource Strain: Low Risk  (4/1/2024)    Financial Resource Strain     Within the past 12 months, have you or your family members you live with been unable to get utilities (heat, electricity) when it was really needed?: No   Food Insecurity: Low Risk  (4/1/2024)    Food Insecurity     Within the past 12 months, did you worry that your food would run out before you got money to buy more?: No     Within the past 12 months, did the food you bought just not last and you didn t have money to get more?: No   Transportation Needs: Low Risk  (4/1/2024)    Transportation Needs     Within the past 12 months, has lack of transportation kept you from medical appointments, getting your medicines, non-medical meetings or appointments, work, or from getting things that you need?: No   Physical Activity: Insufficiently Active (4/1/2024)    Exercise Vital Sign     Days of Exercise per  Week: 3 days     Minutes of Exercise per Session: 30 min   Stress: No Stress Concern Present (2024)    [place] of Occupational Health - Occupational Stress Questionnaire     Feeling of Stress : Only a little   Social Connections: Unknown (2024)    Social Connection and Isolation Panel [NHANES]     Frequency of Communication with Friends and Family: Not on file     Frequency of Social Gatherings with Friends and Family: Once a week     Attends Mu-ism Services: Not on file     Active Member of Clubs or Organizations: Not on file     Attends Club or Organization Meetings: Not on file     Marital Status: Not on file   Interpersonal Safety: Low Risk  (10/4/2024)    Interpersonal Safety     Do you feel physically and emotionally safe where you currently live?: Yes     Within the past 12 months, have you been hit, slapped, kicked or otherwise physically hurt by someone?: No     Within the past 12 months, have you been humiliated or emotionally abused in other ways by your partner or ex-partner?: No   Housing Stability: Low Risk  (2024)    Housing Stability     Do you have housing? : Yes     Are you worried about losing your housing?: No         FAMILY HISTORY:  Family History   Problem Relation Age of Onset    Diabetes Mother     Hypertension Mother     Diabetes Father     Hypertension Father     C.A.D. Father          of MI at age of 70    Breast Cancer Maternal Grandmother         at age 65+, unsure ?    Breast Cancer Paternal Grandmother         55+    Cancer - colorectal No family hx of          PHYSICAL EXAM:  Vital signs:  Not taken.  GENERAL: No acute distress.  EYES: No scleral icterus. No overt erythema.  RESPIRATORY: No audible cough, wheezing, or labored breathing.  MUSCULOSKELETAL: Range of motion in the neck, shoulders, and arms appear normal.  SKIN: No overt rashes, discolorations, or lesions over the face and neck.  NEUROLOGIC: Alert.  No overt tremors.  PSYCHIATRIC: Normal  affect and mood.  Does not appear anxious.       LABS:  CBC RESULTS:   Recent Labs   Lab Test 09/19/24  0951   WBC 5.1   RBC 4.58   HGB 13.2   HCT 38.5   MCV 84   MCH 28.8   MCHC 34.3   RDW 12.8          Recent Labs   Lab Test 09/19/24  0951 04/01/24  0858    138   POTASSIUM 4.5 4.2   CHLORIDE 105 104   CO2 25 24   ANIONGAP 9 10   GLC 97 100*   BUN 11.4 11.2   CR 0.69 0.74   REYMUNDO 9.3 9.5         PATHOLOGY:  Reviewed as per HPI.    IMAGING:  Reviewed as per HPI.    ASSESSMENT/PLAN:  Giuliana Cameron is a 54 year old female with the following issues:  1.  Stage IA, gC7h-D4-P9, grade 1 invasive ductal carcinoma of right upper inner breast, ER positive, CT positive, HER2 negative, Oncotype DX = 9  -I discussed with Giuliana that her Oncotype DX score is 9, with 9-year risk of distant recurrence of 3% after 5 years of hormone blockade therapy, < 1% absolute chemo benefit.   --She is postmenopausal and has not had a menstrual period since her late 40's.    -Discussed adjuvant radiation therapy to reduce risk of local recurrence and consider adjuvant endocrine therapy for 5 years to reduce risk of recurrence by relative 50%.  Discussed options of tamoxifen versus anastrozole.  Would favor the latter.  She is also hesitant to take any medication that could raise risk of other cancers, so will not consider tamoxifen.  --I discussed the potential side effects of anastrozole, including but not limited to: fatigue, myalgias/arthralgias, bone density loss, decrease sexual drive, vaginal dryness, nausea, headache, difficulty sleeping, hot flashes, night sweats, small cardiovascular risk.   --Advised adequate calcium, vitamin D, and weight bearing exercise.  --Plan to start anastrozole 1 mg daily 1 week post-radiation.  --Advised Tylenol for her postoperative breast pain.  She denies signs of infection.  --Will obtain baseline DEXA scan, scheduled for 10/24/24.    Return in 3-4 months.    Funmi Langford MD  M  [facility] Hematology/Oncology    Total time spent today: 30 minutes in chart review, patient evaluation, counseling, documentation, test and/or medication/prescription orders, and coordination of care.      The longitudinal plan of care for the diagnosis(es)/condition(s) as documented were addressed during this visit. Due to the added complexity in care, I will continue to support Giuliana in the subsequent management and with ongoing continuity of care.

## 2024-10-18 LAB — SPECIMEN STATUS: NORMAL

## 2024-10-21 ENCOUNTER — VIRTUAL VISIT (OUTPATIENT)
Dept: ONCOLOGY | Facility: CLINIC | Age: 54
End: 2024-10-21
Attending: INTERNAL MEDICINE
Payer: COMMERCIAL

## 2024-10-21 ENCOUNTER — TELEPHONE (OUTPATIENT)
Dept: ONCOLOGY | Facility: CLINIC | Age: 54
End: 2024-10-21

## 2024-10-21 ENCOUNTER — TRANSFERRED RECORDS (OUTPATIENT)
Dept: HEALTH INFORMATION MANAGEMENT | Facility: CLINIC | Age: 54
End: 2024-10-21

## 2024-10-21 DIAGNOSIS — C50.211 MALIGNANT NEOPLASM OF UPPER-INNER QUADRANT OF RIGHT BREAST IN FEMALE, ESTROGEN RECEPTOR POSITIVE (H): Primary | ICD-10-CM

## 2024-10-21 DIAGNOSIS — Z17.0 MALIGNANT NEOPLASM OF UPPER-INNER QUADRANT OF RIGHT BREAST IN FEMALE, ESTROGEN RECEPTOR POSITIVE (H): Primary | ICD-10-CM

## 2024-10-21 PROCEDURE — 99214 OFFICE O/P EST MOD 30 MIN: CPT | Mod: 95 | Performed by: INTERNAL MEDICINE

## 2024-10-21 PROCEDURE — G2211 COMPLEX E/M VISIT ADD ON: HCPCS | Mod: 95 | Performed by: INTERNAL MEDICINE

## 2024-10-21 RX ORDER — ANASTROZOLE 1 MG/1
1 TABLET ORAL DAILY
Qty: 90 TABLET | Refills: 3 | Status: SHIPPED | OUTPATIENT
Start: 2024-10-21

## 2024-10-21 NOTE — LETTER
10/21/2024      Giuliana Cameron  [address] [place] [address]      Dear Colleague,    Thank you for referring your patient, Giuliana Cameron, to the [facility]. Please see a copy of my visit note below.    [facility]    Hematology/Oncology Established Patient Note    Giuliana is a 54 year old who is being evaluated via a billable video visit.          Video-Visit Details    Type of service:  Video Visit   Originating Location (pt. Location): Home    Distant Location (provider location):  On-site  Platform used for Video Visit: Agnes  Signed Electronically by: Funmi Langford MD    Video visit duration: 16 minutes     Today's Date: 10/10/24    Reason for visit: Right breast cancer.    HISTORY OF PRESENT ILLNESS: Giuliana Cameron is a 54 year old female who presents with the following oncologic history:  1.  8/28/2024: Screening mammogram showed mass in upper inner right breast.  Left breast negative.  2. 9/4/2024: Right breast ultrasound showed 1.6 x 1.1 x 1 cm mass at 1:00, 8 cm from nipple.  Right axillary ultrasound showed normal-appearing nodes.  3.  9/4/2024: Right breast biopsy at 1:00 showed grade 1 invasive ductal carcinoma measuring at least 9 mm in greatest linear extent, no LVI, grade 2 DCIS present, ER strongly positive at 100%, [place] strongly positive at 100%, HER2 negative with IHC = 1+.  4.  9/23/2024: Right breast lumpectomy with right axillary sentinel lymph node excision under care of Dr. Geoff Hurst.  Pathology showed 1.6 x 1.2 x 1 cm grade 1 invasive ductal carcinoma, grade 2 DCIS estimated at 3.9 cm with invasive carcinoma located less than 0.1 cm from closest inferior margin and DCIS located less than 0.1 cm from closest posterior margin.  Total 1 right axillary sentinel lymph node negative for carcinoma.  Reexcision of inferior margin showed multiple foci of DCIS present at new margin. Oncotype DX pending.  5. 9/30/2024:  Breast MRI showed hematoma at right upper inner breast spanning 8 cm and surrounding non-mass like enhancement reflecting postoperative changes.  No right axillary or right internal mammary chain lymphadenopathy.  Left breast negative.  6.  10/4/2024: Reexcision of positive margin in right breast.  Pathology showed residual foci of grade 2 DCIS, microscopic foci of DCIS ranging from 0.1 mm to 0.8 mm, DCIS is 1.3 mm from new inferior margin. Oncotype DX = 9, 9-year risk of distant recurrence of 3% after 5 years of hormone blockade therapy, < 1% absolute chemo benefit.      INTERVAL HISTORY:  Giuliana reports some tenderness of the right breast but denies any erythema.  It is not throbbing.    REVIEW OF SYSTEMS:   14 point ROS was reviewed and is negative other than as noted above in HPI.       HOME MEDICATIONS:  Current Outpatient Medications   Medication Sig Dispense Refill     anastrozole (ARIMIDEX) 1 MG tablet Take 1 tablet (1 mg) by mouth daily. 90 tablet 3     multivitamins w/minerals liquid            ALLERGIES:  Allergies   Allergen Reactions     No Known Allergies          PAST MEDICAL HISTORY:  Past Medical History:   Diagnosis Date     Dyspepsia and other specified disorders of function of stomach 01/01/2003    pos H.pylori tx'ed     PONV (postoperative nausea and vomiting)      Vitamin D deficiencies 01/01/2011   Did not use IUD, OCPs, or HRT.      PAST SURGICAL HISTORY:  Past Surgical History:   Procedure Laterality Date     BIOPSY NODE SENTINEL Right 9/23/2024    Procedure: sentinel lymph node biopsy;  Surgeon: Geoff Hurst MD;  Location:  OR     LAPAROSCOPIC CHOLECYSTECTOMY  06/01/2004    Cholecystectomy, Laparoscopic     LUMPECTOMY BREAST Right 10/4/2024    Procedure: positive margin re-excision right breast;  Surgeon: Geoff Hurst MD;  Location:  OR     LUMPECTOMY, BREAST, LOCALIZED USING RADIOFREQUENCY IDENTIFICATION Right 9/23/2024    Procedure: LUMPECTOMY, BREAST, LOCALIZED  USING RADIOFREQUENCY IDENTIFICATION,;  Surgeon: Geoff Hurst MD;  Location: [facility] OR     SHOULDER SURGERY       ZZC  DELIVERY ONLY  2004    , Low Cervical   Right shoulder surgery.      SOCIAL HISTORY:  Social History     Socioeconomic History     Marital status:      Spouse name: Not on file     Number of children: 2     Years of education: Not on file     Highest education level: Not on file   Occupational History     Occupation:      Employer: [employer]   Tobacco Use     Smoking status: Never     Smokeless tobacco: Never   Vaping Use     Vaping status: Never Used   Substance and Sexual Activity     Alcohol use: No     Drug use: No     Sexual activity: Yes     Partners: Male     Birth control/protection: Condom   Other Topics Concern      Service No     Blood Transfusions No     Caffeine Concern No     Occupational Exposure No     Hobby Hazards No     Sleep Concern No     Stress Concern Yes     Weight Concern Yes     Special Diet No     Back Care No     Exercise Yes     Comment: aerobics, treadmill, weights     Bike Helmet Yes     Seat Belt Yes     Self-Exams No     Parent/sibling w/ CABG, MI or angioplasty before 65F 55M? Not Asked   Social History Narrative    , 2 kids, non smoker, alcohol none, working full time      Social Determinants of Health     Financial Resource Strain: Low Risk  (2024)    Financial Resource Strain      Within the past 12 months, have you or your family members you live with been unable to get utilities (heat, electricity) when it was really needed?: No   Food Insecurity: Low Risk  (2024)    Food Insecurity      Within the past 12 months, did you worry that your food would run out before you got money to buy more?: No      Within the past 12 months, did the food you bought just not last and you didn t have money to get more?: No   Transportation Needs: Low Risk  (2024)    Transportation Needs      Within the  past 12 months, has lack of transportation kept you from medical appointments, getting your medicines, non-medical meetings or appointments, work, or from getting things that you need?: No   Physical Activity: Insufficiently Active (2024)    Exercise Vital Sign      Days of Exercise per Week: 3 days      Minutes of Exercise per Session: 30 min   Stress: No Stress Concern Present (2024)    [place] [employer] - Occupational Stress Questionnaire      Feeling of Stress : Only a little   Social Connections: Unknown (2024)    Social Connection and Isolation Panel [NHANES]      Frequency of Communication with Friends and Family: Not on file      Frequency of Social Gatherings with Friends and Family: Once a week      Attends [employer] Services: Not on file      Active Member of Clubs or Organizations: Not on file      Attends Club or Organization Meetings: Not on file      Marital Status: Not on file   Interpersonal Safety: Low Risk  (10/4/2024)    Interpersonal Safety      Do you feel physically and emotionally safe where you currently live?: Yes      Within the past 12 months, have you been hit, slapped, kicked or otherwise physically hurt by someone?: No      Within the past 12 months, have you been humiliated or emotionally abused in other ways by your partner or ex-partner?: No   Housing Stability: Low Risk  (2024)    Housing Stability      Do you have housing? : Yes      Are you worried about losing your housing?: No         FAMILY HISTORY:  Family History   Problem Relation Age of Onset     Diabetes Mother      Hypertension Mother      Diabetes Father      Hypertension Father      C.A.D. Father          of MI at age of 70     Breast Cancer Maternal Grandmother         at age 65+, unsure ?     Breast Cancer Paternal Grandmother         55+     Cancer - colorectal No family hx of          PHYSICAL EXAM:  Vital signs:  Not taken.  GENERAL: No acute distress.  EYES: No scleral  icterus. No overt erythema.  RESPIRATORY: No audible cough, wheezing, or labored breathing.  MUSCULOSKELETAL: Range of motion in the neck, shoulders, and arms appear normal.  SKIN: No overt rashes, discolorations, or lesions over the face and neck.  NEUROLOGIC: Alert.  No overt tremors.  PSYCHIATRIC: Normal affect and mood.  Does not appear anxious.       LABS:  CBC RESULTS:   Recent Labs   Lab Test 09/19/24  0951   WBC 5.1   RBC 4.58   HGB 13.2   HCT 38.5   MCV 84   MCH 28.8   MCHC 34.3   RDW 12.8          Recent Labs   Lab Test 09/19/24  0951 04/01/24  0858    138   POTASSIUM 4.5 4.2   CHLORIDE 105 104   CO2 25 24   ANIONGAP 9 10   GLC 97 100*   BUN 11.4 11.2   CR 0.69 0.74   REYMUNDO 9.3 9.5         PATHOLOGY:  Reviewed as per HPI.    IMAGING:  Reviewed as per HPI.    ASSESSMENT/PLAN:  Giuliana Cameron is a 54 year old female with the following issues:  1.  Stage IA, [id number], grade 1 invasive ductal carcinoma of right upper inner breast, ER positive, ME positive, HER2 negative, Oncotype DX = 9  -I discussed with Giuliana that her Oncotype DX score is 9, with 9-year risk of distant recurrence of 3% after 5 years of hormone blockade therapy, < 1% absolute chemo benefit.   --She is postmenopausal and has not had a menstrual period since her late 40's.    -Discussed adjuvant radiation therapy to reduce risk of local recurrence and consider adjuvant endocrine therapy for 5 years to reduce risk of recurrence by relative 50%.  Discussed options of tamoxifen versus anastrozole.  Would favor the latter.  She is also hesitant to take any medication that could raise risk of other cancers, so will not consider tamoxifen.  --I discussed the potential side effects of anastrozole, including but not limited to: fatigue, myalgias/arthralgias, bone density loss, decrease sexual drive, vaginal dryness, nausea, headache, difficulty sleeping, hot flashes, night sweats, small cardiovascular risk.   --Advised adequate  calcium, vitamin D, and weight bearing exercise.  --Plan to start anastrozole 1 mg daily 1 week post-radiation.  --Advised Tylenol for her postoperative breast pain.  She denies signs of infection.  --Will obtain baseline DEXA scan, scheduled for 10/24/24.    Return in 3-4 months.    Funmi Langford MD  [facility] Hematology/Oncology    Total time spent today: 30 minutes in chart review, patient evaluation, counseling, documentation, test and/or medication/prescription orders, and coordination of care.      The longitudinal plan of care for the diagnosis(es)/condition(s) as documented were addressed during this visit. Due to the added complexity in care, I will continue to support Giuliana in the subsequent management and with ongoing continuity of care.      Again, thank you for allowing me to participate in the care of your patient.        Sincerely,        Funmi Langford MD

## 2024-10-21 NOTE — TELEPHONE ENCOUNTER
Patient needs to be rescheduled for their virtual visit due to Reason for Reschedule: No-Show    Unable to reach patient, LVM x3    Scheduling team, please refer to service line late cancellation/no-show policies and reach out to patient at a later date for rescheduling.    Appointment mode: Video  Provider: Dr. Primitivo Buckley, VF/LPN

## 2024-10-21 NOTE — LETTER
10/21/2024      Giuliana Cameron  [address] [place] [address]      Dear Colleague,    Thank you for referring your patient, Giuliana Cameorn, to the [facility]. Please see a copy of my visit note below.    [facility] Cancer Care    Hematology/Oncology Established Patient Note    Giuliana is a 54 year old who is being evaluated via a billable video visit.          Video-Visit Details    Type of service:  Video Visit   Originating Location (pt. Location): Home    Distant Location (provider location):  On-site  Platform used for Video Visit: Agnes  Signed Electronically by: Funmi Langford MD    Video visit duration: 16 minutes     Today's Date: 10/10/24    Reason for visit: Right breast cancer.    HISTORY OF PRESENT ILLNESS: Giuliana Cameron is a 54 year old female who presents with the following oncologic history:  1.  8/28/2024: Screening mammogram showed mass in upper inner right breast.  Left breast negative.  2. 9/4/2024: Right breast ultrasound showed 1.6 x 1.1 x 1 cm mass at 1:00, 8 cm from nipple.  Right axillary ultrasound showed normal-appearing nodes.  3.  9/4/2024: Right breast biopsy at 1:00 showed grade 1 invasive ductal carcinoma measuring at least 9 mm in greatest linear extent, no LVI, grade 2 DCIS present, ER strongly positive at 100%, [place] strongly positive at 100%, HER2 negative with IHC = 1+.  4.  9/23/2024: Right breast lumpectomy with right axillary sentinel lymph node excision under care of Dr. Geoff Hurst.  Pathology showed 1.6 x 1.2 x 1 cm grade 1 invasive ductal carcinoma, grade 2 DCIS estimated at 3.9 cm with invasive carcinoma located less than 0.1 cm from closest inferior margin and DCIS located less than 0.1 cm from closest posterior margin.  Total 1 right axillary sentinel lymph node negative for carcinoma.  Reexcision of inferior margin showed multiple foci of DCIS present at new margin. Oncotype DX pending.  5. 9/30/2024:  Breast MRI showed hematoma at right upper inner breast spanning 8 cm and surrounding non-mass like enhancement reflecting postoperative changes.  No right axillary or right internal mammary chain lymphadenopathy.  Left breast negative.  6.  10/4/2024: Reexcision of positive margin in right breast.  Pathology showed residual foci of grade 2 DCIS, microscopic foci of DCIS ranging from 0.1 mm to 0.8 mm, DCIS is 1.3 mm from new inferior margin. Oncotype DX = 9, 9-year risk of distant recurrence of 3% after 5 years of hormone blockade therapy, < 1% absolute chemo benefit.      INTERVAL HISTORY:  Giuliana reports some tenderness of the right breast but denies any erythema.  It is not throbbing.    REVIEW OF SYSTEMS:   14 point ROS was reviewed and is negative other than as noted above in HPI.       HOME MEDICATIONS:  Current Outpatient Medications   Medication Sig Dispense Refill     anastrozole (ARIMIDEX) 1 MG tablet Take 1 tablet (1 mg) by mouth daily. 90 tablet 3     multivitamins w/minerals liquid            ALLERGIES:  Allergies   Allergen Reactions     No Known Allergies          PAST MEDICAL HISTORY:  Past Medical History:   Diagnosis Date     Dyspepsia and other specified disorders of function of stomach 01/01/2003    pos H.pylori tx'ed     PONV (postoperative nausea and vomiting)      Vitamin D deficiencies 01/01/2011   Did not use IUD, OCPs, or HRT.      PAST SURGICAL HISTORY:  Past Surgical History:   Procedure Laterality Date     BIOPSY NODE SENTINEL Right 9/23/2024    Procedure: sentinel lymph node biopsy;  Surgeon: Geoff Hurst MD;  Location:  OR     LAPAROSCOPIC CHOLECYSTECTOMY  06/01/2004    Cholecystectomy, Laparoscopic     LUMPECTOMY BREAST Right 10/4/2024    Procedure: positive margin re-excision right breast;  Surgeon: Geoff Hurst MD;  Location:  OR     LUMPECTOMY, BREAST, LOCALIZED USING RADIOFREQUENCY IDENTIFICATION Right 9/23/2024    Procedure: LUMPECTOMY, BREAST, LOCALIZED  USING RADIOFREQUENCY IDENTIFICATION,;  Surgeon: Geoff Hurst MD;  Location: [facility] OR     SHOULDER SURGERY       ZZC  DELIVERY ONLY  2004    , Low Cervical   Right shoulder surgery.      SOCIAL HISTORY:  Social History     Socioeconomic History     Marital status:      Spouse name: Not on file     Number of children: 2     Years of education: Not on file     Highest education level: Not on file   Occupational History     Occupation:      Employer: [employer]   Tobacco Use     Smoking status: Never     Smokeless tobacco: Never   Vaping Use     Vaping status: Never Used   Substance and Sexual Activity     Alcohol use: No     Drug use: No     Sexual activity: Yes     Partners: Male     Birth control/protection: Condom   Other Topics Concern      Service No     Blood Transfusions No     Caffeine Concern No     Occupational Exposure No     Hobby Hazards No     Sleep Concern No     Stress Concern Yes     Weight Concern Yes     Special Diet No     Back Care No     Exercise Yes     Comment: aerobics, treadmill, weights     Bike Helmet Yes     Seat Belt Yes     Self-Exams No     Parent/sibling w/ CABG, MI or angioplasty before 65F 55M? Not Asked   Social History Narrative    , 2 kids, non smoker, alcohol none, working full time      Social Determinants of Health     Financial Resource Strain: Low Risk  (2024)    Financial Resource Strain      Within the past 12 months, have you or your family members you live with been unable to get utilities (heat, electricity) when it was really needed?: No   Food Insecurity: Low Risk  (2024)    Food Insecurity      Within the past 12 months, did you worry that your food would run out before you got money to buy more?: No      Within the past 12 months, did the food you bought just not last and you didn t have money to get more?: No   Transportation Needs: Low Risk  (2024)    Transportation Needs      Within the  past 12 months, has lack of transportation kept you from medical appointments, getting your medicines, non-medical meetings or appointments, work, or from getting things that you need?: No   Physical Activity: Insufficiently Active (2024)    Exercise Vital Sign      Days of Exercise per Week: 3 days      Minutes of Exercise per Session: 30 min   Stress: No Stress Concern Present (2024)    [place] [employer] - Occupational Stress Questionnaire      Feeling of Stress : Only a little   Social Connections: Unknown (2024)    Social Connection and Isolation Panel [NHANES]      Frequency of Communication with Friends and Family: Not on file      Frequency of Social Gatherings with Friends and Family: Once a week      Attends [employer] Services: Not on file      Active Member of Clubs or Organizations: Not on file      Attends Club or Organization Meetings: Not on file      Marital Status: Not on file   Interpersonal Safety: Low Risk  (10/4/2024)    Interpersonal Safety      Do you feel physically and emotionally safe where you currently live?: Yes      Within the past 12 months, have you been hit, slapped, kicked or otherwise physically hurt by someone?: No      Within the past 12 months, have you been humiliated or emotionally abused in other ways by your partner or ex-partner?: No   Housing Stability: Low Risk  (2024)    Housing Stability      Do you have housing? : Yes      Are you worried about losing your housing?: No         FAMILY HISTORY:  Family History   Problem Relation Age of Onset     Diabetes Mother      Hypertension Mother      Diabetes Father      Hypertension Father      C.A.D. Father          of MI at age of 70     Breast Cancer Maternal Grandmother         at age 65+, unsure ?     Breast Cancer Paternal Grandmother         55+     Cancer - colorectal No family hx of          PHYSICAL EXAM:  Vital signs:  Not taken.  GENERAL: No acute distress.  EYES: No scleral  icterus. No overt erythema.  RESPIRATORY: No audible cough, wheezing, or labored breathing.  MUSCULOSKELETAL: Range of motion in the neck, shoulders, and arms appear normal.  SKIN: No overt rashes, discolorations, or lesions over the face and neck.  NEUROLOGIC: Alert.  No overt tremors.  PSYCHIATRIC: Normal affect and mood.  Does not appear anxious.       LABS:  CBC RESULTS:   Recent Labs   Lab Test 09/19/24  0951   WBC 5.1   RBC 4.58   HGB 13.2   HCT 38.5   MCV 84   MCH 28.8   MCHC 34.3   RDW 12.8          Recent Labs   Lab Test 09/19/24  0951 04/01/24  0858    138   POTASSIUM 4.5 4.2   CHLORIDE 105 104   CO2 25 24   ANIONGAP 9 10   GLC 97 100*   BUN 11.4 11.2   CR 0.69 0.74   REYMUNDO 9.3 9.5         PATHOLOGY:  Reviewed as per HPI.    IMAGING:  Reviewed as per HPI.    ASSESSMENT/PLAN:  Giuliana Cameron is a 54 year old female with the following issues:  1.  Stage IA, [id number], grade 1 invasive ductal carcinoma of right upper inner breast, ER positive, CT positive, HER2 negative, Oncotype DX = 9  -I discussed with Giuliana that her Oncotype DX score is 9, with 9-year risk of distant recurrence of 3% after 5 years of hormone blockade therapy, < 1% absolute chemo benefit.   --She is postmenopausal and has not had a menstrual period since her late 40's.    -Discussed adjuvant radiation therapy to reduce risk of local recurrence and consider adjuvant endocrine therapy for 5 years to reduce risk of recurrence by relative 50%.  Discussed options of tamoxifen versus anastrozole.  Would favor the latter.  She is also hesitant to take any medication that could raise risk of other cancers, so will not consider tamoxifen.  --I discussed the potential side effects of anastrozole, including but not limited to: fatigue, myalgias/arthralgias, bone density loss, decrease sexual drive, vaginal dryness, nausea, headache, difficulty sleeping, hot flashes, night sweats, small cardiovascular risk.   --Advised adequate  calcium, vitamin D, and weight bearing exercise.  --Plan to start anastrozole 1 mg daily 1 week post-radiation.  --Advised Tylenol for her postoperative breast pain.  She denies signs of infection.  --Will obtain baseline DEXA scan, scheduled for 10/24/24.    Return in 3-4 months.    Funmi Langford MD  [facility] Hematology/Oncology    Total time spent today: 30 minutes in chart review, patient evaluation, counseling, documentation, test and/or medication/prescription orders, and coordination of care.      The longitudinal plan of care for the diagnosis(es)/condition(s) as documented were addressed during this visit. Due to the added complexity in care, I will continue to support Giuliana in the subsequent management and with ongoing continuity of care.      Again, thank you for allowing me to participate in the care of your patient.        Sincerely,        Funmi Langford MD

## 2024-10-21 NOTE — PATIENT INSTRUCTIONS
1. Start anastrozole 1 mg daily, 1 week after completion of radiation.  2. Keep DEXA scan for 10/24/2024.  3. RTC MD in 3-4 months.

## 2024-10-24 ENCOUNTER — HOSPITAL ENCOUNTER (OUTPATIENT)
Dept: BONE DENSITY | Facility: CLINIC | Age: 54
Discharge: HOME | End: 2024-10-24
Attending: INTERNAL MEDICINE | Admitting: INTERNAL MEDICINE
Payer: COMMERCIAL

## 2024-10-24 DIAGNOSIS — M85.9 DISORDER OF BONE DENSITY AND STRUCTURE, UNSPECIFIED: ICD-10-CM

## 2024-10-24 PROCEDURE — 77080 DXA BONE DENSITY AXIAL: CPT

## 2024-11-15 ENCOUNTER — TRANSFERRED RECORDS (OUTPATIENT)
Dept: HEALTH INFORMATION MANAGEMENT | Facility: CLINIC | Age: 54
End: 2024-11-15
Payer: COMMERCIAL

## 2024-12-16 DIAGNOSIS — R21 RASH: ICD-10-CM

## 2024-12-16 DIAGNOSIS — Z17.0 MALIGNANT NEOPLASM OF UPPER-INNER QUADRANT OF RIGHT BREAST IN FEMALE, ESTROGEN RECEPTOR POSITIVE (H): Primary | ICD-10-CM

## 2024-12-16 DIAGNOSIS — C50.211 MALIGNANT NEOPLASM OF UPPER-INNER QUADRANT OF RIGHT BREAST IN FEMALE, ESTROGEN RECEPTOR POSITIVE (H): Primary | ICD-10-CM

## 2024-12-16 RX ORDER — NYSTATIN 100000 [USP'U]/G
POWDER TOPICAL 2 TIMES DAILY
Qty: 60 G | Refills: 3 | Status: SHIPPED | OUTPATIENT
Start: 2024-12-16

## 2025-01-02 NOTE — PROGRESS NOTES
Oncology/Hematology Visit Note  1/2/2025     Reason for Visit:  Right breast concerns    Right Breast Cancer    Oncology HPI:   - 8/28/2024: Screening mammogram showed mass in upper inner right breast.  Left breast negative.  -   9/4/2024: Right breast ultrasound showed 1.6 x 1.1 x 1 cm mass at 1:00, 8 cm from nipple.  Right axillary ultrasound showed normal-appearing nodes.  -   9/4/2024: Right breast biopsy at 1:00 showed grade 1 invasive ductal carcinoma measuring at least 9 mm in greatest linear extent, no LVI, grade 2 DCIS present, ER strongly positive at 100%, NJ strongly positive at 100%, HER2 negative with IHC = 1+.  - 9/23/2024: Right breast lumpectomy with right axillary sentinel lymph node excision under care of Dr. Geoff Hurst.  Pathology showed 1.6 x 1.2 x 1 cm grade 1 invasive ductal carcinoma, grade 2 DCIS estimated at 3.9 cm with invasive carcinoma located less than 0.1 cm from closest inferior margin and DCIS located less than 0.1 cm from closest posterior margin.  Total 1 right axillary sentinel lymph node negative for carcinoma.  Reexcision of inferior margin showed multiple foci of DCIS present at new margin. Oncotype DX score is 9; with 9-year risk of distant recurrence of 3% after 5 years of hormone blockade therapy, < 1% absolute chemo benefit.   -  9/30/2024: Breast MRI showed hematoma at right upper inner breast spanning 8 cm and surrounding non-mass like enhancement reflecting postoperative changes.  No right axillary or right internal mammary chain lymphadenopathy.  Left breast negative.  -  10/4/2024: Reexcision of positive margin in right breast.  Pathology showed residual foci of grade 2 DCIS, microscopic foci of DCIS ranging from 0.1 mm to 0.8 mm, DCIS is 1.3 mm from new inferior margin. Oncotype DX = 9, 9-year risk of distant recurrence of 3% after 5 years of hormone blockade therapy, < 1% absolute chemo benefit.  - 11/11-11/15/61404 Radiation to Right Breast 2600 cGY x 5 fxs  -  1122 (approx) started anastrozole     Current Intervention: Anastrozole 1 mg    Interval history:   Giuliana presents to clinic for right breast concerns, she is accompanied by her , Husam. She notes tenderness to site of previous hematoma, and tingling at right axillary node excision.  In addition, notes skin changes underneath right breast. She states that the previous hematoma site is tender to touch some times, and at times is painful when she applies pressure.  She is having tingling sensations at axillary node site.  Denies edema of breast, erythema, fevers or chills.     She is tolerating anastrozole well.     She will be traveling to [facility] in the upcoming weeks and will be there for approximately 2 weeks.     Denies s/s of infection.       Review of Systems: See interval hx. Denies fevers, chills, dizziness,  changes in vision, abdominal pain, N/V, diarrhea, changes in urination, bleeding, bruising, rash.     Current Outpatient Medications   Medication Sig Dispense Refill    anastrozole (ARIMIDEX) 1 MG tablet Take 1 tablet (1 mg) by mouth daily. 90 tablet 3    multivitamins w/minerals liquid       nystatin (MYCOSTATIN) 966804 UNIT/GM external powder Apply topically 2 times daily. 60 g 3          Allergies   Allergen Reactions    No Known Allergies          Exam:  Blood pressure 113/76, pulse 77, temperature 97.8  F (36.6  C), temperature source Oral, resp. rate 16, weight 69.1 kg (152 lb 6.4 oz), last menstrual period 06/20/2017, SpO2 98%, not currently breastfeeding.  Wt Readings from Last 4 Encounters:   01/03/25 69.1 kg (152 lb 6.4 oz)   10/10/24 66.9 kg (147 lb 6.4 oz)   10/04/24 67.1 kg (148 lb)   09/23/24 67.4 kg (148 lb 11.2 oz)       Constitutional: Pleasant female  in no acute distress.  Eyes: No scleral icterus. No conjunctival erythema or discharge  Cardiovascular: Regular rate and rhythm. No murmurs, gallops, or rubs. No peripheral edema.  Respiratory: Clear to auscultation bilaterally. No  wheezes or crackles.  Right Breast: radiation changes underneath breast; site of  previous hematoma without erythema, edema or fluctuance.  There is an area of firmness at surgical site most likely scar tissue. No palpable breast mass or axillary mass.   MSK: moving all extremities freely  Neuro: Cranial nerves II-XII intact  Gait:walking unassisted  Skin: post radiation changes underneath right breast, no open wounds  Psych: appropriate mood and affect     Labs: reviewed    Imaging: reviewed    Impression/plan: Giuliana is a 54 yearl old female with right breast cancer, currently taking anastrozole. She presents to clinic for right breast concerns    History of Right Breast Hematoma/Axillary incision pain  - discussed with patient that we could US site of previous hematoma to ensure there is no fluid build up, but also discussed that her symptoms could also be post radiation changes  - Offered US for reassurance and further evaluation  - She expressed that she is doing really well, and would like to defer US right now, and if she wishes to proceed with US prior to her departure to [facility], she will let me know.  - discussed red flags: edema or erythema of right breast, increased pain, fevers or chills would warrant a phone call to clinic or ED, she endorsed understanding      Right Breast Cancer   Stage IA, [id number], grade 1 invasive ductal carcinoma of right upper inner breast, ER positive, AR positive, HER2 negative, Oncotype DX = 9   - post menopausal  - completed XRT to right breast 11/15/2024  - started anastrozole approximately 11/22/24 1 mg daily 1 week post-radiation and tolerating well  - 2/13 Dr. Langford as scheduled    Bone Health  - 10/24/24 DEXA with Osteopenia- no need to start bone health agent at this time  - continue 1200 mg calcium divided amounts daily, vitamin D 1000 international unit(s) Daily, and weight bearing exercise   - plan to repeat your DEXA scan in 2 years.     Marlin Ledesma PA-C    The  longitudinal plan of care for the diagnosis(es)/condition(s) as documented were addressed during this visit. Due to the added complexity in care, I will continue to support Giuliana in the subsequent management and with ongoing continuity of care.      potential side effects of anastrozole, including but not limited to: fatigue, myalgias/arthralgias, bone density loss, decrease sexual drive, vaginal dryness, nausea, headache, difficulty sleeping, hot flashes, night sweats, small cardiovascular risk.

## 2025-01-02 NOTE — TELEPHONE ENCOUNTER
[facility] MEDICINE PROGRESS NOTE    Patient: Yehuda Ramirez Today's Date: 1/2/2025   YOB: 1935 Admission Date: 1/2/2025  1:35 AM   MRN: [id number] Inpatient LOS: 0 day(s)   Room:  [facility] Patient Contact     S/w pt and relayed message from provider, see below. She stated her understanding and states the bleeding seems to be coming from vaginal area, as if she's having a period. She does have appointment this Friday with gynecologist. Does provider advise to keep this appointment or does provider advise to see pt instead, is same day OK to schedule if provider wants pt to be seen in clinic?    Pt physical is in a month 8/11.    Sachi HUNTLEY RN  [facility]    the help of staff at the bedside    This visit is being performed virtually via Video visit using [employer].   Clinical Location: Home  Yehuda's location [facility] and is physically present in   the [facility] nursing home, Home care   PT     OT     SLP       Anticipated discharge destination: Home        Barriers to Discharge:  Continue acute care          Lenin Bernardo,  MD  Hospitalist  1/2/2025  1:06 PM

## 2025-01-03 ENCOUNTER — ONCOLOGY VISIT (OUTPATIENT)
Dept: ONCOLOGY | Facility: CLINIC | Age: 55
End: 2025-01-03
Payer: COMMERCIAL

## 2025-01-03 VITALS
RESPIRATION RATE: 16 BRPM | TEMPERATURE: 97.8 F | OXYGEN SATURATION: 98 % | DIASTOLIC BLOOD PRESSURE: 76 MMHG | HEART RATE: 77 BPM | BODY MASS INDEX: 28.33 KG/M2 | SYSTOLIC BLOOD PRESSURE: 113 MMHG | WEIGHT: 152.4 LBS

## 2025-01-03 DIAGNOSIS — Z17.0 MALIGNANT NEOPLASM OF UPPER-INNER QUADRANT OF RIGHT BREAST IN FEMALE, ESTROGEN RECEPTOR POSITIVE (H): Primary | ICD-10-CM

## 2025-01-03 DIAGNOSIS — C50.211 MALIGNANT NEOPLASM OF UPPER-INNER QUADRANT OF RIGHT BREAST IN FEMALE, ESTROGEN RECEPTOR POSITIVE (H): Primary | ICD-10-CM

## 2025-01-03 DIAGNOSIS — N64.4 BREAST PAIN, RIGHT: ICD-10-CM

## 2025-01-03 PROCEDURE — G2211 COMPLEX E/M VISIT ADD ON: HCPCS

## 2025-01-03 PROCEDURE — 99213 OFFICE O/P EST LOW 20 MIN: CPT

## 2025-01-03 ASSESSMENT — PAIN SCALES - GENERAL: PAINLEVEL_OUTOF10: MILD PAIN (3)

## 2025-01-03 NOTE — PROGRESS NOTES
"Oncology Rooming Note    January 3, 2025 3:43 PM   Giuliana Cameron is a 54 year old female who presents for:    Chief Complaint   Patient presents with    Oncology Clinic Visit     Initial Vitals: /76   Pulse 77   Temp 97.8  F (36.6  C) (Oral)   Resp 16   Wt 69.1 kg (152 lb 6.4 oz)   LMP 06/20/2017 (Approximate)   SpO2 98%   BMI 28.33 kg/m   Estimated body mass index is 28.33 kg/m  as calculated from the following:    Height as of 10/4/24: 1.562 m (5' 1.5\").    Weight as of this encounter: 69.1 kg (152 lb 6.4 oz). Body surface area is 1.73 meters squared.  Mild Pain (3) Comment: Data Unavailable   Patient's last menstrual period was 06/20/2017 (approximate).  Allergies reviewed: Yes  Medications reviewed: Yes    Medications: Medication refills not needed today.  Pharmacy name entered into [employer] [facility]:    [id number] - [place], [place] - [address] [id number] - [place] [address]  [id number] - [place], [place] - [address] [facility] [place] - [address]    Frailty Screening:   Is the patient here for a new oncology consult visit in cancer care? 2. No      Clinical concerns:   pa was notified.  Right breast intermitted pain 3/10. Under arm pit      Miranda Lane, PRASANTH              "

## 2025-01-03 NOTE — LETTER
1/3/2025      Giluiana Cameron  [address]  [place] [address]      Dear Colleague,    Thank you for referring your patient, Giuliana Cameron, to the [facility]. Please see a copy of my visit note below.    Oncology/Hematology Visit Note  1/2/2025     Reason for Visit:  Right breast concerns    Right Breast Cancer    Oncology HPI:   - 8/28/2024: Screening mammogram showed mass in upper inner right breast.  Left breast negative.  -   9/4/2024: Right breast ultrasound showed 1.6 x 1.1 x 1 cm mass at 1:00, 8 cm from nipple.  Right axillary ultrasound showed normal-appearing nodes.  -   9/4/2024: Right breast biopsy at 1:00 showed grade 1 invasive ductal carcinoma measuring at least 9 mm in greatest linear extent, no LVI, grade 2 DCIS present, ER strongly positive at 100%, NC strongly positive at 100%, HER2 negative with IHC = 1+.  - 9/23/2024: Right breast lumpectomy with right axillary sentinel lymph node excision under care of Dr. Geoff Hurst.  Pathology showed 1.6 x 1.2 x 1 cm grade 1 invasive ductal carcinoma, grade 2 DCIS estimated at 3.9 cm with invasive carcinoma located less than 0.1 cm from closest inferior margin and DCIS located less than 0.1 cm from closest posterior margin.  Total 1 right axillary sentinel lymph node negative for carcinoma.  Reexcision of inferior margin showed multiple foci of DCIS present at new margin. Oncotype DX score is 9; with 9-year risk of distant recurrence of 3% after 5 years of hormone blockade therapy, < 1% absolute chemo benefit.   -  9/30/2024: Breast MRI showed hematoma at right upper inner breast spanning 8 cm and surrounding non-mass like enhancement reflecting postoperative changes.  No right axillary or right internal mammary chain lymphadenopathy.  Left breast negative.  -  10/4/2024: Reexcision of positive margin in right breast.  Pathology showed residual foci of grade 2 DCIS, microscopic foci of DCIS ranging from 0.1  mm to 0.8 mm, DCIS is 1.3 mm from new inferior margin. Oncotype DX = 9, 9-year risk of distant recurrence of 3% after 5 years of hormone blockade therapy, < 1% absolute chemo benefit.  - 11/11-11/15/97316 Radiation to Right Breast 2600 cGY x 5 fxs  - 1122 (approx) started anastrozole     Current Intervention: Anastrozole 1 mg    Interval history:   Giuliana presents to clinic for right breast concerns, she is accompanied by her , Husam. She notes tenderness to site of previous hematoma, and tingling at right axillary node excision.  In addition, notes skin changes underneath right breast. She states that the previous hematoma site is tender to touch some times, and at times is painful when she applies pressure.  She is having tingling sensations at axillary node site.  Denies edema of breast, erythema, fevers or chills.     She is tolerating anastrozole well.     She will be traveling to [facility] in the upcoming weeks and will be there for approximately 2 weeks.     Denies s/s of infection.       Review of Systems: See interval hx. Denies fevers, chills, dizziness,  changes in vision, abdominal pain, N/V, diarrhea, changes in urination, bleeding, bruising, rash.     Current Outpatient Medications   Medication Sig Dispense Refill     anastrozole (ARIMIDEX) 1 MG tablet Take 1 tablet (1 mg) by mouth daily. 90 tablet 3     multivitamins w/minerals liquid        nystatin (MYCOSTATIN) 545959 UNIT/GM external powder Apply topically 2 times daily. 60 g 3          Allergies   Allergen Reactions     No Known Allergies          Exam:  Blood pressure 113/76, pulse 77, temperature 97.8  F (36.6  C), temperature source Oral, resp. rate 16, weight 69.1 kg (152 lb 6.4 oz), last menstrual period 06/20/2017, SpO2 98%, not currently breastfeeding.  Wt Readings from Last 4 Encounters:   01/03/25 69.1 kg (152 lb 6.4 oz)   10/10/24 66.9 kg (147 lb 6.4 oz)   10/04/24 67.1 kg (148 lb)   09/23/24 67.4 kg (148 lb 11.2 oz)        Constitutional: Pleasant female  in no acute distress.  Eyes: No scleral icterus. No conjunctival erythema or discharge  Cardiovascular: Regular rate and rhythm. No murmurs, gallops, or rubs. No peripheral edema.  Respiratory: Clear to auscultation bilaterally. No wheezes or crackles.  Right Breast: radiation changes underneath breast; site of  previous hematoma without erythema, edema or fluctuance.  There is an area of firmness at surgical site most likely scar tissue. No palpable breast mass or axillary mass.   MSK: moving all extremities freely  Neuro: Cranial nerves II-XII intact  Gait:walking unassisted  Skin: post radiation changes underneath right breast, no open wounds  Psych: appropriate mood and affect     Labs: reviewed    Imaging: reviewed    Impression/plan: Giuliana is a 54 yearl old female with right breast cancer, currently taking anastrozole. She presents to clinic for right breast concerns    History of Right Breast Hematoma/Axillary incision pain  - discussed with patient that we could US site of previous hematoma to ensure there is no fluid build up, but also discussed that her symptoms could also be post radiation changes  - Offered US for reassurance and further evaluation  - She expressed that she is doing really well, and would like to defer US right now, and if she wishes to proceed with US prior to her departure to [facility], she will let me know.  - discussed red flags: edema or erythema of right breast, increased pain, fevers or chills would warrant a phone call to clinic or ED, she endorsed understanding      Right Breast Cancer   Stage IA, [id number], grade 1 invasive ductal carcinoma of right upper inner breast, ER positive, MA positive, HER2 negative, Oncotype DX = 9   - post menopausal  - completed XRT to right breast 11/15/2024  - started anastrozole approximately 11/22/24 1 mg daily 1 week post-radiation and tolerating well  - 2/13 Dr. Langford as scheduled    Bone Health  -  "10/24/24 DEXA with Osteopenia- no need to start bone health agent at this time  - continue 1200 mg calcium divided amounts daily, vitamin D 1000 international unit(s) Daily, and weight bearing exercise   - plan to repeat your DEXA scan in 2 years.     Marlin Ledesma PA-C    The longitudinal plan of care for the diagnosis(es)/condition(s) as documented were addressed during this visit. Due to the added complexity in care, I will continue to support Giuliana in the subsequent management and with ongoing continuity of care.      potential side effects of anastrozole, including but not limited to: fatigue, myalgias/arthralgias, bone density loss, decrease sexual drive, vaginal dryness, nausea, headache, difficulty sleeping, hot flashes, night sweats, small cardiovascular risk.     Oncology Rooming Note    January 3, 2025 3:43 PM   Giuliana Cameron is a 54 year old female who presents for:    Chief Complaint   Patient presents with     Oncology Clinic Visit     Initial Vitals: /76   Pulse 77   Temp 97.8  F (36.6  C) (Oral)   Resp 16   Wt 69.1 kg (152 lb 6.4 oz)   LMP 06/20/2017 (Approximate)   SpO2 98%   BMI 28.33 kg/m   Estimated body mass index is 28.33 kg/m  as calculated from the following:    Height as of 10/4/24: 1.562 m (5' 1.5\").    Weight as of this encounter: 69.1 kg (152 lb 6.4 oz). Body surface area is 1.73 meters squared.  Mild Pain (3) Comment: Data Unavailable   Patient's last menstrual period was 06/20/2017 (approximate).  Allergies reviewed: Yes  Medications reviewed: Yes    Medications: Medication refills not needed today.  Pharmacy name entered into [employer]:    [id number] - [place], [place] - [address] [facility] - [place], [place] - [address]  [id number] - [place], [place] - [address] [facility] [place] [address]    Frailty Screening:   Is the patient here for a new oncology consult visit in cancer " care? 2. No      Clinical concerns:   pa was notified.  Right breast intermitted pain 3/10. Under arm pit      Miranda Lane, [facility]                Again, thank you for allowing me to participate in the care of your patient.        Sincerely,        Marlin Ledesma PA-C    Electronically signed

## 2025-02-03 NOTE — PROGRESS NOTES
[facility] [place]    Hematology/Oncology Established Patient Note      Today's Date: 2/13/25    Reason for visit: Right breast cancer.    HISTORY OF PRESENT ILLNESS: Giuliana Cameron is a 54 year old female who presents with the following oncologic history:  1.  8/28/2024: Screening mammogram showed mass in upper inner right breast.  Left breast negative.  2.  9/4/2024: Right breast ultrasound showed 1.6 x 1.1 x 1 cm mass at 1:00, 8 cm from nipple.  Right axillary ultrasound showed normal-appearing nodes.  3.  9/4/2024: Right breast biopsy at 1:00 showed grade 1 invasive ductal carcinoma measuring at least 9 mm in greatest linear extent, no LVI, grade 2 DCIS present, ER strongly positive at 100%, MS strongly positive at 100%, HER2 negative with IHC = 1+.  4.  9/23/2024: Right breast lumpectomy with right axillary sentinel lymph node excision under care of Dr. Geoff Hurst.  Pathology showed 1.6 x 1.2 x 1 cm grade 1 invasive ductal carcinoma, grade 2 DCIS estimated at 3.9 cm with invasive carcinoma located less than 0.1 cm from closest inferior margin and DCIS located less than 0.1 cm from closest posterior margin.  Total 1 right axillary sentinel lymph node negative for carcinoma.  Reexcision of inferior margin showed multiple foci of DCIS present at new margin. Oncotype DX pending.  5.  9/30/2024: Breast MRI showed hematoma at right upper inner breast spanning 8 cm and surrounding non-mass like enhancement reflecting postoperative changes.  No right axillary or right internal mammary chain lymphadenopathy.  Left breast negative.  6.  10/4/2024: Reexcision of positive margin in right breast.  Pathology showed residual foci of grade 2 DCIS, microscopic foci of DCIS ranging from 0.1 mm to 0.8 mm, DCIS is 1.3 mm from new inferior margin. Oncotype DX = 9, 9-year risk of distant recurrence of 3% after 5 years of hormone blockade therapy, < 1% absolute chemo benefit.  7. 11/11/2024-11/15/2024: Completed  radiation to right breast, 2600 cGy in 5 fractions.  8. 2024: Started anastrozole.    INTERVAL HISTORY:  Giuliana reports she is feeling overall well and exercising daily.  Every evening she does have hot flashes but only lasts few minutes -- overall tolerable.  She occasionally feels dehydrated.    REVIEW OF SYSTEMS:   14 point ROS was reviewed and is negative other than as noted above in HPI.       HOME MEDICATIONS:  Current Outpatient Medications   Medication Sig Dispense Refill    anastrozole (ARIMIDEX) 1 MG tablet Take 1 tablet (1 mg) by mouth daily. 90 tablet 3    Calcium 250 MG CAPS Take by mouth.      Vitamin D, Cholecalciferol, 25 MCG (1000 UT) CAPS Take by mouth.           ALLERGIES:  Allergies   Allergen Reactions    No Known Allergies          PAST MEDICAL HISTORY:  Past Medical History:   Diagnosis Date    Dyspepsia and other specified disorders of function of stomach 2003    pos H.pylori tx'ed    PONV (postoperative nausea and vomiting)     Vitamin D deficiencies 2011   Did not use IUD, OCPs, or HRT.      PAST SURGICAL HISTORY:  Past Surgical History:   Procedure Laterality Date    BIOPSY NODE SENTINEL Right 2024    Procedure: sentinel lymph node biopsy;  Surgeon: Geoff Hurst MD;  Location:  OR    LAPAROSCOPIC CHOLECYSTECTOMY  2004    Cholecystectomy, Laparoscopic    LUMPECTOMY BREAST Right 10/4/2024    Procedure: positive margin re-excision right breast;  Surgeon: Geoff Hurst MD;  Location:  OR    LUMPECTOMY, BREAST, LOCALIZED USING RADIOFREQUENCY IDENTIFICATION Right 2024    Procedure: LUMPECTOMY, BREAST, LOCALIZED USING RADIOFREQUENCY IDENTIFICATION,;  Surgeon: Geoff Hurst MD;  Location:  OR    SHOULDER SURGERY      [facility]  DELIVERY ONLY  2004    , Low Cervical   Right shoulder surgery.      SOCIAL HISTORY:  Social History     Socioeconomic History    Marital status:      Spouse name: Not on file     Number of children: 2    Years of education: Not on file    Highest education level: Not on file   Occupational History    Occupation:      Employer: [employer]   Tobacco Use    Smoking status: Never    Smokeless tobacco: Never   Vaping Use    Vaping status: Never Used   Substance and Sexual Activity    Alcohol use: No    Drug use: No    Sexual activity: Yes     Partners: Male     Birth control/protection: Condom   Other Topics Concern     Service No    Blood Transfusions No    Caffeine Concern No    Occupational Exposure No    Hobby Hazards No    Sleep Concern No    Stress Concern Yes    Weight Concern Yes    Special Diet No    Back Care No    Exercise Yes     Comment: aerobics, treadmill, weights    Bike Helmet Yes    Seat Belt Yes    Self-Exams No    Parent/sibling w/ CABG, MI or angioplasty before 65F 55M? Not Asked   Social History Narrative    , 2 kids, non smoker, alcohol none, working full time      Social Drivers of Health     Financial Resource Strain: Low Risk  (4/1/2024)    Financial Resource Strain     Within the past 12 months, have you or your family members you live with been unable to get utilities (heat, electricity) when it was really needed?: No   Food Insecurity: Low Risk  (4/1/2024)    Food Insecurity     Within the past 12 months, did you worry that your food would run out before you got money to buy more?: No     Within the past 12 months, did the food you bought just not last and you didn t have money to get more?: No   Transportation Needs: Low Risk  (4/1/2024)    Transportation Needs     Within the past 12 months, has lack of transportation kept you from medical appointments, getting your medicines, non-medical meetings or appointments, work, or from getting things that you need?: No   Physical Activity: Insufficiently Active (4/1/2024)    Exercise Vital Sign     Days of Exercise per Week: 3 days     Minutes of Exercise per Session: 30 min   Stress: No Stress  Concern Present (2024)    [employer] [place] [employer] - Occupational Stress Questionnaire     Feeling of Stress : Only a little   Social Connections: Unknown (2024)    Social Connection and Isolation Panel [NHANES]     Frequency of Communication with Friends and Family: Not on file     Frequency of Social Gatherings with Friends and Family: Once a week     Attends Synagogue Services: Not on file     Active Member of Clubs or Organizations: Not on file     Attends Club or Organization Meetings: Not on file     Marital Status: Not on file   Interpersonal Safety: Low Risk  (10/4/2024)    Interpersonal Safety     Do you feel physically and emotionally safe where you currently live?: Yes     Within the past 12 months, have you been hit, slapped, kicked or otherwise physically hurt by someone?: No     Within the past 12 months, have you been humiliated or emotionally abused in other ways by your partner or ex-partner?: No   Housing Stability: Low Risk  (2024)    Housing Stability     Do you have housing? : Yes     Are you worried about losing your housing?: No         FAMILY HISTORY:  Family History   Problem Relation Age of Onset    Diabetes Mother     Hypertension Mother     Diabetes Father     Hypertension Father     C.A.D. Father          of MI at age of 70    Breast Cancer Maternal Grandmother         at age 65+, unsure ?    Breast Cancer Paternal Grandmother         55+    Cancer - colorectal No family hx of          PHYSICAL EXAM:  Vital signs:  /71   Pulse 77   Resp 16   Wt 67.1 kg (148 lb)   LMP 2017 (Approximate)   SpO2 97%   BMI 27.51 kg/m     ECO  GENERAL/CONSTITUTIONAL: No acute distress.  EYES: No erythema or scleral icterus.  ENT/MOUTH: Neck supple.  LYMPH: No cervical, supraclavicular, axillary adenopathy.   BREAST: No palpable masses in either breast. Firmer right breast post-radiation. Nipples are everted bilaterally with no discharge. No erythema,  ulceration, or dimpling of the skin.   RESPIRATORY: No audible cough or wheezing.   GASTROINTESTINAL: No guarding.  No distention.  MUSCULOSKELETAL: Warm and well-perfused, no cyanosis, clubbing, or edema.  NEUROLOGIC: No focal motor deficits. Alert, oriented, answers questions appropriately.  INTEGUMENTARY: No rashes or jaundice.  GAIT: Steady, does not use assistive device       LABS:  CBC RESULTS:   Recent Labs   Lab Test 09/19/24  0951   WBC 5.1   RBC 4.58   HGB 13.2   HCT 38.5   MCV 84   MCH 28.8   MCHC 34.3   RDW 12.8          Recent Labs   Lab Test 09/19/24  0951 04/01/24  0858    138   POTASSIUM 4.5 4.2   CHLORIDE 105 104   CO2 25 24   ANIONGAP 9 10   GLC 97 100*   BUN 11.4 11.2   CR 0.69 0.74   REYMUNDO 9.3 9.5         PATHOLOGY:  Reviewed as per HPI.    IMAGING:  Reviewed as per HPI.    ASSESSMENT/PLAN:  Giuliana Cameron is a 54 year old female with the following issues:  1.  Stage IA, [id number], grade 1 invasive ductal carcinoma of right upper inner breast, ER positive, NJ positive, HER2 negative, Oncotype DX = 9  2. Right breast pain  -Oncotype DX = 9, with 9-year risk of distant recurrence of 3% after 5 years of hormone blockade therapy, < 1% absolute chemo benefit.   --She is postmenopausal and has not had a menstrual period since her late 40's.    -She is s/p adjuvant radiation therapy and currently on adjuvant anastrozole, tolerating well so far.  --She has no clinical evidence for recurrent breast cancer by physical exam today. Discussed applying gentle massage to right breast to break up scar tissue to alleviate discomfort.  --Due for bilateral diagnostic mammogram in 5/2025 then annually.  --Advised total 5 years of anastrozole.    3. Osteopenia  --10/24/2024 baseline DEXA scan showed osteopenia.  --Advised adequate calcium, vitamin D, and weight bearing exercise.  --Her 10-year probability of major osteoporotic fracture is not yet high enough to warrant bisphosphonate therapy. Will  repeat DEXA in 10/2026.    Return in 4 months.    Funmi Langford MD  [facility] Hematology/Oncology    Total time spent today: 30 minutes in chart review, patient evaluation, counseling, documentation, test and/or medication/prescription orders, and coordination of care.      The longitudinal plan of care for the diagnosis(es)/condition(s) as documented were addressed during this visit. Due to the added complexity in care, I will continue to support Giuliana in the subsequent management and with ongoing continuity of care.

## 2025-02-13 ENCOUNTER — ONCOLOGY VISIT (OUTPATIENT)
Dept: ONCOLOGY | Facility: CLINIC | Age: 55
End: 2025-02-13
Attending: INTERNAL MEDICINE
Payer: COMMERCIAL

## 2025-02-13 VITALS
HEART RATE: 77 BPM | BODY MASS INDEX: 27.51 KG/M2 | WEIGHT: 148 LBS | SYSTOLIC BLOOD PRESSURE: 104 MMHG | RESPIRATION RATE: 16 BRPM | DIASTOLIC BLOOD PRESSURE: 71 MMHG | OXYGEN SATURATION: 97 %

## 2025-02-13 DIAGNOSIS — N64.4 BREAST PAIN, RIGHT: ICD-10-CM

## 2025-02-13 DIAGNOSIS — Z17.0 MALIGNANT NEOPLASM OF UPPER-INNER QUADRANT OF RIGHT BREAST IN FEMALE, ESTROGEN RECEPTOR POSITIVE (H): Primary | ICD-10-CM

## 2025-02-13 DIAGNOSIS — M85.89 OSTEOPENIA OF MULTIPLE SITES: ICD-10-CM

## 2025-02-13 DIAGNOSIS — C50.211 MALIGNANT NEOPLASM OF UPPER-INNER QUADRANT OF RIGHT BREAST IN FEMALE, ESTROGEN RECEPTOR POSITIVE (H): Primary | ICD-10-CM

## 2025-02-13 PROCEDURE — 99213 OFFICE O/P EST LOW 20 MIN: CPT | Performed by: INTERNAL MEDICINE

## 2025-02-13 RX ORDER — FAMOTIDINE 20 MG
TABLET ORAL
COMMUNITY

## 2025-02-13 ASSESSMENT — PAIN SCALES - GENERAL: PAINLEVEL_OUTOF10: NO PAIN (0)

## 2025-02-13 NOTE — LETTER
2/13/2025      Giuliana Cameron  [address] [place] [address]      Dear Colleague,    Thank you for referring your patient, Giuliana Cameron, to the [facility]. Please see a copy of my visit note below.    [facility]    Hematology/Oncology Established Patient Note      Today's Date: 2/13/25    Reason for visit: Right breast cancer.    HISTORY OF PRESENT ILLNESS: Giuliana Cameron is a 54 year old female who presents with the following oncologic history:  1.  8/28/2024: Screening mammogram showed mass in upper inner right breast.  Left breast negative.  2. 9/4/2024: Right breast ultrasound showed 1.6 x 1.1 x 1 cm mass at 1:00, 8 cm from nipple.  Right axillary ultrasound showed normal-appearing nodes.  3.  9/4/2024: Right breast biopsy at 1:00 showed grade 1 invasive ductal carcinoma measuring at least 9 mm in greatest linear extent, no LVI, grade 2 DCIS present, ER strongly positive at 100%, [place] strongly positive at 100%, HER2 negative with IHC = 1+.  4.  9/23/2024: Right breast lumpectomy with right axillary sentinel lymph node excision under care of Dr. Geoff Hurst.  Pathology showed 1.6 x 1.2 x 1 cm grade 1 invasive ductal carcinoma, grade 2 DCIS estimated at 3.9 cm with invasive carcinoma located less than 0.1 cm from closest inferior margin and DCIS located less than 0.1 cm from closest posterior margin.  Total 1 right axillary sentinel lymph node negative for carcinoma.  Reexcision of inferior margin showed multiple foci of DCIS present at new margin. Oncotype DX pending.  5. 9/30/2024: Breast MRI showed hematoma at right upper inner breast spanning 8 cm and surrounding non-mass like enhancement reflecting postoperative changes.  No right axillary or right internal mammary chain lymphadenopathy.  Left breast negative.  6.  10/4/2024: Reexcision of positive margin in right breast.  Pathology showed residual foci of grade 2 DCIS,  microscopic foci of DCIS ranging from 0.1 mm to 0.8 mm, DCIS is 1.3 mm from new inferior margin. Oncotype DX = 9, 9-year risk of distant recurrence of 3% after 5 years of hormone blockade therapy, < 1% absolute chemo benefit.  7. 11/11/2024-11/15/2024: Completed radiation to right breast, 2600 cGy in 5 fractions.  8. 11/22/2024: Started anastrozole.    INTERVAL HISTORY:  Giuliana reports she is feeling overall well and exercising daily.  Every evening she does have hot flashes but only lasts few minutes -- overall tolerable.  She occasionally feels dehydrated.    REVIEW OF SYSTEMS:   14 point ROS was reviewed and is negative other than as noted above in HPI.       HOME MEDICATIONS:  Current Outpatient Medications   Medication Sig Dispense Refill     anastrozole (ARIMIDEX) 1 MG tablet Take 1 tablet (1 mg) by mouth daily. 90 tablet 3     Calcium 250 MG CAPS Take by mouth.       Vitamin D, Cholecalciferol, 25 MCG (1000 UT) CAPS Take by mouth.           ALLERGIES:  Allergies   Allergen Reactions     No Known Allergies          PAST MEDICAL HISTORY:  Past Medical History:   Diagnosis Date     Dyspepsia and other specified disorders of function of stomach 01/01/2003    pos H.pylori tx'ed     PONV (postoperative nausea and vomiting)      Vitamin D deficiencies 01/01/2011   Did not use IUD, OCPs, or HRT.      PAST SURGICAL HISTORY:  Past Surgical History:   Procedure Laterality Date     BIOPSY NODE SENTINEL Right 9/23/2024    Procedure: sentinel lymph node biopsy;  Surgeon: Geoff Hurst MD;  Location:  OR     LAPAROSCOPIC CHOLECYSTECTOMY  06/01/2004    Cholecystectomy, Laparoscopic     LUMPECTOMY BREAST Right 10/4/2024    Procedure: positive margin re-excision right breast;  Surgeon: Geoff Hurst MD;  Location:  OR     LUMPECTOMY, BREAST, LOCALIZED USING RADIOFREQUENCY IDENTIFICATION Right 9/23/2024    Procedure: LUMPECTOMY, BREAST, LOCALIZED USING RADIOFREQUENCY IDENTIFICATION,;  Surgeon: Natali  Geoff Long MD;  Location: [facility] OR     SHOULDER SURGERY       ZZC  DELIVERY ONLY  2004    , Low Cervical   Right shoulder surgery.      SOCIAL HISTORY:  Social History     Socioeconomic History     Marital status:      Spouse name: Not on file     Number of children: 2     Years of education: Not on file     Highest education level: Not on file   Occupational History     Occupation:      Employer: [employer]   Tobacco Use     Smoking status: Never     Smokeless tobacco: Never   Vaping Use     Vaping status: Never Used   Substance and Sexual Activity     Alcohol use: No     Drug use: No     Sexual activity: Yes     Partners: Male     Birth control/protection: Condom   Other Topics Concern      Service No     Blood Transfusions No     Caffeine Concern No     Occupational Exposure No     Hobby Hazards No     Sleep Concern No     Stress Concern Yes     Weight Concern Yes     Special Diet No     Back Care No     Exercise Yes     Comment: aerobics, treadmill, weights     Bike Helmet Yes     Seat Belt Yes     Self-Exams No     Parent/sibling w/ CABG, MI or angioplasty before 65F 55M? Not Asked   Social History Narrative    , 2 kids, non smoker, alcohol none, working full time      Social Drivers of Health     Financial Resource Strain: Low Risk  (2024)    Financial Resource Strain      Within the past 12 months, have you or your family members you live with been unable to get utilities (heat, electricity) when it was really needed?: No   Food Insecurity: Low Risk  (2024)    Food Insecurity      Within the past 12 months, did you worry that your food would run out before you got money to buy more?: No      Within the past 12 months, did the food you bought just not last and you didn t have money to get more?: No   Transportation Needs: Low Risk  (2024)    Transportation Needs      Within the past 12 months, has lack of transportation kept you from  medical appointments, getting your medicines, non-medical meetings or appointments, work, or from getting things that you need?: No   Physical Activity: Insufficiently Active (2024)    Exercise Vital Sign      Days of Exercise per Week: 3 days      Minutes of Exercise per Session: 30 min   Stress: No Stress Concern Present (2024)    [place] [employer] - Occupational Stress Questionnaire      Feeling of Stress : Only a little   Social Connections: Unknown (2024)    Social Connection and Isolation Panel [NHANES]      Frequency of Communication with Friends and Family: Not on file      Frequency of Social Gatherings with Friends and Family: Once a week      Attends Shinto Services: Not on file      Active Member of Clubs or Organizations: Not on file      Attends Club or Organization Meetings: Not on file      Marital Status: Not on file   Interpersonal Safety: Low Risk  (10/4/2024)    Interpersonal Safety      Do you feel physically and emotionally safe where you currently live?: Yes      Within the past 12 months, have you been hit, slapped, kicked or otherwise physically hurt by someone?: No      Within the past 12 months, have you been humiliated or emotionally abused in other ways by your partner or ex-partner?: No   Housing Stability: Low Risk  (2024)    Housing Stability      Do you have housing? : Yes      Are you worried about losing your housing?: No         FAMILY HISTORY:  Family History   Problem Relation Age of Onset     Diabetes Mother      Hypertension Mother      Diabetes Father      Hypertension Father      C.A.D. Father          of MI at age of 70     Breast Cancer Maternal Grandmother         at age 65+, unsure ?     Breast Cancer Paternal Grandmother         55+     Cancer - colorectal No family hx of          PHYSICAL EXAM:  Vital signs:  /71   Pulse 77   Resp 16   Wt 67.1 kg (148 lb)   LMP 2017 (Approximate)   SpO2 97%   BMI 27.51  kg/m     ECO  GENERAL/CONSTITUTIONAL: No acute distress.  EYES: No erythema or scleral icterus.  ENT/MOUTH: Neck supple.  LYMPH: No cervical, supraclavicular, axillary adenopathy.   BREAST: No palpable masses in either breast. Firmer right breast post-radiation. Nipples are everted bilaterally with no discharge. No erythema, ulceration, or dimpling of the skin.   RESPIRATORY: No audible cough or wheezing.   GASTROINTESTINAL: No guarding.  No distention.  MUSCULOSKELETAL: Warm and well-perfused, no cyanosis, clubbing, or edema.  NEUROLOGIC: No focal motor deficits. Alert, oriented, answers questions appropriately.  INTEGUMENTARY: No rashes or jaundice.  GAIT: Steady, does not use assistive device       LABS:  CBC RESULTS:   Recent Labs   Lab Test 24  0951   WBC 5.1   RBC 4.58   HGB 13.2   HCT 38.5   MCV 84   MCH 28.8   MCHC 34.3   RDW 12.8          Recent Labs   Lab Test 24  0951 24  0858    138   POTASSIUM 4.5 4.2   CHLORIDE 105 104   CO2 25 24   ANIONGAP 9 10   GLC 97 100*   BUN 11.4 11.2   CR 0.69 0.74   REYMUNDO 9.3 9.5         PATHOLOGY:  Reviewed as per HPI.    IMAGING:  Reviewed as per HPI.    ASSESSMENT/PLAN:  Giuliana Cameron is a 54 year old female with the following issues:  1.  Stage IA, [id number], grade 1 invasive ductal carcinoma of right upper inner breast, ER positive, FL positive, HER2 negative, Oncotype DX = 9  2. Right breast pain  -Oncotype DX = 9, with 9-year risk of distant recurrence of 3% after 5 years of hormone blockade therapy, < 1% absolute chemo benefit.   --She is postmenopausal and has not had a menstrual period since her late 40's.    -She is s/p adjuvant radiation therapy and currently on adjuvant anastrozole, tolerating well so far.  --She has no clinical evidence for recurrent breast cancer by physical exam today. Discussed applying gentle massage to right breast to break up scar tissue to alleviate discomfort.  --Due for bilateral diagnostic  mammogram in 5/2025 then annually.  --Advised total 5 years of anastrozole.    3. Osteopenia  --10/24/2024 baseline DEXA scan showed osteopenia.  --Advised adequate calcium, vitamin D, and weight bearing exercise.  --Her 10-year probability of major osteoporotic fracture is not yet high enough to warrant bisphosphonate therapy. Will repeat DEXA in 10/2026.    Return in 4 months.    Funmi Langford MD  [facility] Hematology/Oncology    Total time spent today: 30 minutes in chart review, patient evaluation, counseling, documentation, test and/or medication/prescription orders, and coordination of care.      The longitudinal plan of care for the diagnosis(es)/condition(s) as documented were addressed during this visit. Due to the added complexity in care, I will continue to support Giuliana in the subsequent management and with ongoing continuity of care.      Again, thank you for allowing me to participate in the care of your patient.        Sincerely,        Funmi Langford MD    Electronically signed

## 2025-03-04 NOTE — ANESTHESIA POSTPROCEDURE EVALUATION
Patient: Giuliana Cameron    Procedure: Procedure(s):  positive margin re-excision right breast       Anesthesia Type:  General    Note:  Disposition: Outpatient   Postop Pain Control: Uneventful            Sign Out: Well controlled pain   PONV: No   Neuro/Psych: Uneventful            Sign Out: Acceptable/Baseline neuro status   Airway/Respiratory: Uneventful            Sign Out: Acceptable/Baseline resp. status   CV/Hemodynamics: Uneventful            Sign Out: Acceptable CV status; No obvious hypovolemia; No obvious fluid overload   Other NRE: NONE   DID A NON-ROUTINE EVENT OCCUR? No           Last vitals:  Vitals Value Taken Time   /72 10/04/24 1315   Temp 36  C (96.8  F) 10/04/24 1300   Pulse 64 10/04/24 1317   Resp 37 10/04/24 1318   SpO2 98 % 10/04/24 1317   Vitals shown include unfiled device data.    Electronically Signed By: Jeanette Garcia MD  October 4, 2024  2:55 PM   3 = A little assistance

## 2025-04-08 ENCOUNTER — TELEPHONE (OUTPATIENT)
Dept: FAMILY MEDICINE | Facility: CLINIC | Age: 55
End: 2025-04-08
Payer: COMMERCIAL

## 2025-04-08 NOTE — TELEPHONE ENCOUNTER
Reason for Call:  Appointment Request    Patient requesting this type of appt:  Preventive     Requested provider:      Reason patient unable to be scheduled: Not within requested timeframe    When does patient want to be seen/preferred time:  Sometime within April still.     Comments: Patient is needing an Appointment in April still for annual, has some other medical things going on that might be a conflict with current scheduled annual. Will call back if she doesn't need to reschedule this annual, but was hoping to schedule for a later date to be safe.     Could we send this information to you in [employer] or would you prefer to receive a phone call?:   Patient would prefer a phone call   Okay to leave a detailed message?: Yes at Cell number on file:    Telephone Information:   Mobile [phone]       Call taken on 4/8/2025 at 11:05 AM by Cady Delaney

## 2025-04-08 NOTE — TELEPHONE ENCOUNTER
Has appointment with me tomorrow - just to clarify, pt is wanting to reschedule for a later date but still within the month of April? If for annual physical, I would probably not use a same/next day slot unless there is a specific reason she cannot be seen in May, June etc when there are regular openings.  Jeanette Fatima PA-C on 4/8/2025 at 11:40 AM

## 2025-04-08 NOTE — TELEPHONE ENCOUNTER
The reason why she might want to move the appointment is because she is wanting to know if her oncologist has any more blood work that needs to be done, she wants to do it at the same time and she does it tomorrow 4/9/2025. She wants to know if you can take a look at her history to be able to let her know what kind of blood work she needs to do.     I let the patient know that you can look at her history with her in the visit.

## 2025-04-09 ENCOUNTER — TELEPHONE (OUTPATIENT)
Dept: FAMILY MEDICINE | Facility: CLINIC | Age: 55
End: 2025-04-09

## 2025-04-09 NOTE — TELEPHONE ENCOUNTER
Called patient and scheduled patient per provider's message below. She agreed with this plan and had no further questions.     Tammie TEIXEIRA RN  [facility] Triage Team

## 2025-04-09 NOTE — TELEPHONE ENCOUNTER
We can review at her appointment. One option is to put in future lab orders as they relate to her physical exam, then do a lab only appointment another time once orders are in from her oncologist as well.   Jeanette Fatima PA-C on 4/8/2025 at 8:08 PM

## 2025-04-09 NOTE — TELEPHONE ENCOUNTER
Called the patient for clarification. She stated that she is really wanting an appointment in April for her physical. She is not having any concerns but is wanting her physical to make she she is doing good. Is provider okay with using a same day slot for the patient?    Tammie TEIXEIRA RN  [facility] Triage Team

## 2025-04-09 NOTE — TELEPHONE ENCOUNTER
,  Patient Returning Call    Reason for call: PT had an appt with Jeanette today - But jeanette will not be in - PT needs to had labs done and is concerned that they need to be done ASAP    Information relayed to patient:    Informed PT that  a nurse will be call her to talk about this Issues     Patient has additional questions:  no        Is an  needed?:  No      Could we send this information to you in [facility] or would you prefer to receive a phone call?:   Patient would prefer a phone call   Okay to leave a detailed message?: Yes at Cell number on file:    Telephone Information:   Mobile [phone]

## 2025-04-09 NOTE — TELEPHONE ENCOUNTER
Yes, that's fine. Please apologize for the cancellation - I am out ill.  Jeanette Fatima PA-C on 4/9/2025 at 9:45 AM

## 2025-04-21 ENCOUNTER — OFFICE VISIT (OUTPATIENT)
Dept: FAMILY MEDICINE | Facility: CLINIC | Age: 55
End: 2025-04-21
Payer: COMMERCIAL

## 2025-04-21 VITALS
TEMPERATURE: 96.9 F | HEART RATE: 65 BPM | SYSTOLIC BLOOD PRESSURE: 104 MMHG | OXYGEN SATURATION: 97 % | BODY MASS INDEX: 27.19 KG/M2 | DIASTOLIC BLOOD PRESSURE: 60 MMHG | WEIGHT: 144 LBS | HEIGHT: 61 IN | RESPIRATION RATE: 12 BRPM

## 2025-04-21 DIAGNOSIS — E55.9 VITAMIN D DEFICIENCY: ICD-10-CM

## 2025-04-21 DIAGNOSIS — E78.5 HYPERLIPIDEMIA LDL GOAL <130: ICD-10-CM

## 2025-04-21 DIAGNOSIS — Z00.00 ROUTINE GENERAL MEDICAL EXAMINATION AT A HEALTH CARE FACILITY: Primary | ICD-10-CM

## 2025-04-21 DIAGNOSIS — C50.911 INVASIVE DUCTAL CARCINOMA OF BREAST, FEMALE, RIGHT (H): ICD-10-CM

## 2025-04-21 LAB
BASOPHILS # BLD AUTO: 0 10E3/UL (ref 0–0.2)
BASOPHILS NFR BLD AUTO: 1 %
EOSINOPHIL # BLD AUTO: 0.2 10E3/UL (ref 0–0.7)
EOSINOPHIL NFR BLD AUTO: 4 %
ERYTHROCYTE [DISTWIDTH] IN BLOOD BY AUTOMATED COUNT: 13 % (ref 10–15)
HCT VFR BLD AUTO: 37.4 % (ref 35–47)
HGB BLD-MCNC: 13.1 G/DL (ref 11.7–15.7)
IMM GRANULOCYTES # BLD: 0 10E3/UL
IMM GRANULOCYTES NFR BLD: 0 %
LYMPHOCYTES # BLD AUTO: 1.4 10E3/UL (ref 0.8–5.3)
LYMPHOCYTES NFR BLD AUTO: 32 %
MCH RBC QN AUTO: 29.2 PG (ref 26.5–33)
MCHC RBC AUTO-ENTMCNC: 35 G/DL (ref 31.5–36.5)
MCV RBC AUTO: 83 FL (ref 78–100)
MONOCYTES # BLD AUTO: 0.3 10E3/UL (ref 0–1.3)
MONOCYTES NFR BLD AUTO: 7 %
NEUTROPHILS # BLD AUTO: 2.4 10E3/UL (ref 1.6–8.3)
NEUTROPHILS NFR BLD AUTO: 56 %
PLATELET # BLD AUTO: 165 10E3/UL (ref 150–450)
RBC # BLD AUTO: 4.49 10E6/UL (ref 3.8–5.2)
WBC # BLD AUTO: 4.4 10E3/UL (ref 4–11)

## 2025-04-21 PROCEDURE — 82306 VITAMIN D 25 HYDROXY: CPT | Performed by: PHYSICIAN ASSISTANT

## 2025-04-21 PROCEDURE — 85025 COMPLETE CBC W/AUTO DIFF WBC: CPT | Performed by: PHYSICIAN ASSISTANT

## 2025-04-21 PROCEDURE — 3078F DIAST BP <80 MM HG: CPT | Performed by: PHYSICIAN ASSISTANT

## 2025-04-21 PROCEDURE — 80053 COMPREHEN METABOLIC PANEL: CPT | Performed by: PHYSICIAN ASSISTANT

## 2025-04-21 PROCEDURE — 99396 PREV VISIT EST AGE 40-64: CPT | Performed by: PHYSICIAN ASSISTANT

## 2025-04-21 PROCEDURE — 80061 LIPID PANEL: CPT | Performed by: PHYSICIAN ASSISTANT

## 2025-04-21 PROCEDURE — 36415 COLL VENOUS BLD VENIPUNCTURE: CPT | Performed by: PHYSICIAN ASSISTANT

## 2025-04-21 PROCEDURE — 1126F AMNT PAIN NOTED NONE PRSNT: CPT | Performed by: PHYSICIAN ASSISTANT

## 2025-04-21 PROCEDURE — 3074F SYST BP LT 130 MM HG: CPT | Performed by: PHYSICIAN ASSISTANT

## 2025-04-21 SDOH — HEALTH STABILITY: PHYSICAL HEALTH: ON AVERAGE, HOW MANY DAYS PER WEEK DO YOU ENGAGE IN MODERATE TO STRENUOUS EXERCISE (LIKE A BRISK WALK)?: 4 DAYS

## 2025-04-21 SDOH — HEALTH STABILITY: PHYSICAL HEALTH: ON AVERAGE, HOW MANY MINUTES DO YOU ENGAGE IN EXERCISE AT THIS LEVEL?: 30 MIN

## 2025-04-21 ASSESSMENT — PAIN SCALES - GENERAL: PAINLEVEL_OUTOF10: NO PAIN (0)

## 2025-04-21 ASSESSMENT — SOCIAL DETERMINANTS OF HEALTH (SDOH): HOW OFTEN DO YOU GET TOGETHER WITH FRIENDS OR RELATIVES?: ONCE A WEEK

## 2025-04-21 NOTE — PROGRESS NOTES
"Preventive Care Visit  [facility]  Jeanette Fatima PA-C, Physician Assistant - Medical  Apr 21, 2025    Assessment & Plan     Routine general medical examination at a health care facility  - Comprehensive metabolic panel (BMP + Alb, Alk Phos, ALT, AST, Total. Bili, TP)  - CBC with platelets and differential  - Vitamin D Deficiency    Hyperlipidemia LDL goal <130  - Lipid panel reflex to direct LDL Fasting    Invasive ductal carcinoma of breast, female, right (H)  S/P lumpectomy and radiation, no on anastrozole. Follows with oncology.     Vitamin D deficiency  Currently taking 5,000 international unit(s) once per week.   - Vitamin D Deficiency      BMI  Estimated body mass index is 26.94 kg/m  as calculated from the following:    Height as of this encounter: 1.557 m (5' 1.3\").    Weight as of this encounter: 65.3 kg (144 lb).   Weight management plan: Discussed healthy diet and exercise guidelines    Counseling  Appropriate preventive services were addressed with this patient via screening, questionnaire, or discussion as appropriate for fall prevention, nutrition, physical activity, Tobacco-use cessation, social engagement, weight loss and cognition.  Checklist reviewing preventive services available has been given to the patient.  Reviewed patient's diet, addressing concerns and/or questions.       Jeanette Fatima PA-C on 4/21/2025 at 10:06 AM      Edouard Giordano is a 54 year old, presenting for the following:  Physical        4/21/2025     9:59 AM   Additional Questions   Roomed by Juan Antonio SHEARER    Right breast cancer diagnosed 9-2024. S/P lumpectomy/re-excision and radiation. Now on anastrozole. Mammo scheduled in a few weeks 5/8/25    - Pap 2021 negative cotesting  - negative cologuard 4-2024    On vitamin D 5,000 international unit(s) once per week     Advance Care Planning  Discussed advance care planning with patient; however, patient declined at this time.       "  4/21/2025   General Health   How would you rate your overall physical health? Excellent   Feel stress (tense, anxious, or unable to sleep) Only a little   (!) STRESS CONCERN      4/21/2025   Nutrition   Three or more servings of calcium each day? Yes   Diet: Regular (no restrictions)    Vegetarian/vegan   How many servings of fruit and vegetables per day? (!) 2-3   How many sweetened beverages each day? 0-1       Multiple values from one day are sorted in reverse-chronological order         4/21/2025   Exercise   Days per week of moderate/strenous exercise 4 days   Average minutes spent exercising at this level 30 min         4/21/2025   Social Factors   Frequency of gathering with friends or relatives Once a week   Worry food won't last until get money to buy more No   Food not last or not have enough money for food? No   Do you have housing? (Housing is defined as stable permanent housing and does not include staying ouside in a car, in a tent, in an abandoned building, in an overnight shelter, or couch-surfing.) Yes   Are you worried about losing your housing? No   Lack of transportation? No   Unable to get utilities (heat,electricity)? No         4/21/2025   Fall Risk   Fallen 2 or more times in the past year? No   Trouble with walking or balance? No          4/21/2025   Dental   Dentist two times every year? Yes       Today's PHQ-2 Score:       2/13/2025     3:39 PM   PHQ-2 ( 1999 [employer])   Q1: Little interest or pleasure in doing things 0   Q2: Feeling down, depressed or hopeless 0   PHQ-2 Score 0         4/21/2025   Substance Use   Alcohol more than 3/day or more than 7/wk No   Do you use any other substances recreationally? No     Social History     Tobacco Use    Smoking status: Never    Smokeless tobacco: Never   Vaping Use    Vaping status: Never Used   Substance Use Topics    Alcohol use: No    Drug use: No         8/28/2024   LAST FHS-7 RESULTS   1st degree relative breast or ovarian cancer No   Any  relative bilateral breast cancer No   Any male have breast cancer No   Any ONE woman have BOTH breast AND ovarian cancer Yes   Any woman with breast cancer before 50yrs No   2 or more relatives with breast AND/OR ovarian cancer No   2 or more relatives with breast AND/OR bowel cancer No     Mammogram Screening - Annual screen due to history of breast cancer, carcinoma in situ, or hyperplasia        4/21/2025   STI Screening   New sexual partner(s) since last STI/HIV test? No     History of abnormal Pap smear: No - age 30- 64 PAP with HPV every 5 years recommended        Latest Ref Rng & Units 4/7/2021     8:30 AM 4/7/2021     8:25 AM 7/13/2017     9:27 AM   PAP / HPV   PAP (Historical)   NIL     HPV 16 DNA NEG^Negative Negative   Negative    HPV 18 DNA NEG^Negative Negative   Negative    Other HR HPV NEG^Negative Negative   Negative      ASCVD Risk   The 10-year ASCVD risk score (Alisia LOUIS, et al., 2019) is: 1.6%    Values used to calculate the score:      Age: 54 years      Sex: Female      Is Non- : No      Diabetic: No      Tobacco smoker: No      Systolic Blood Pressure: 104 mmHg      Is BP treated: No      HDL Cholesterol: 53 mg/dL      Total Cholesterol: 211 mg/dL    Reviewed and updated as needed this visit by Provider   Tobacco  Allergies  Meds  Problems  Med Hx  Surg Hx  Fam Hx            Past Medical History:   Diagnosis Date    Dyspepsia and other specified disorders of function of stomach 01/01/2003    pos H.pylori tx'ed    PONV (postoperative nausea and vomiting)     Vitamin D deficiencies 01/01/2011     Past Surgical History:   Procedure Laterality Date    BIOPSY NODE SENTINEL Right 9/23/2024    Procedure: sentinel lymph node biopsy;  Surgeon: Geoff Hurst MD;  Location: [facility] OR    LAPAROSCOPIC CHOLECYSTECTOMY  06/01/2004    Cholecystectomy, Laparoscopic    LUMPECTOMY BREAST Right 10/4/2024    Procedure: positive margin re-excision right breast;  Surgeon:  "Geoff Hrust MD;  Location: [facility] OR    LUMPECTOMY, BREAST, LOCALIZED USING RADIOFREQUENCY IDENTIFICATION Right 2024    Procedure: LUMPECTOMY, BREAST, LOCALIZED USING RADIOFREQUENCY IDENTIFICATION,;  Surgeon: Geoff Hurst MD;  Location: [facility] OR    SHOULDER SURGERY      Z  DELIVERY ONLY  2004    , Low Cervical         Review of Systems  Constitutional, HEENT, cardiovascular, pulmonary, GI, , musculoskeletal, neuro, skin, endocrine and psych systems are negative, except as otherwise noted.     Objective    Exam  /60   Pulse 65   Temp 96.9  F (36.1  C) (Tympanic)   Resp 12   Ht 1.557 m (5' 1.3\")   Wt 65.3 kg (144 lb)   LMP 2017 (Approximate)   SpO2 97%   BMI 26.94 kg/m     Estimated body mass index is 26.94 kg/m  as calculated from the following:    Height as of this encounter: 1.557 m (5' 1.3\").    Weight as of this encounter: 65.3 kg (144 lb).    Physical Exam  GENERAL: alert and no distress  EYES: Eyes grossly normal to inspection, PERRL and conjunctivae and sclerae normal  HENT: ear canals and TM's normal, nose and mouth without ulcers or lesions  NECK: no adenopathy, no asymmetry, masses, or scars  RESP: lungs clear to auscultation - no rales, rhonchi or wheezes  BREAST: firmness right breast (also noted in oncology note from Feb, consistent with post radiation), normal without masses, tenderness or nipple discharge and no palpable axillary masses or adenopathy  CV: regular rate and rhythm, normal S1 S2, no S3 or S4, no murmur, click or rub, no peripheral edema  ABDOMEN: soft, nontender, no hepatosplenomegaly, no masses and bowel sounds normal  MS: no gross musculoskeletal defects noted, no edema  SKIN: no suspicious lesions or rashes  NEURO: Normal strength and tone, mentation intact and speech normal  PSYCH: mentation appears normal, affect normal/bright      Signed Electronically by: Jeanette Fatima PA-C    "

## 2025-04-21 NOTE — PATIENT INSTRUCTIONS
Patient Education   Preventive Care Advice   This is general advice given by our system to help you stay healthy. However, your care team may have specific advice just for you. Please talk to your care team about your preventive care needs.  Nutrition  Eat 5 or more servings of fruits and vegetables each day.  Try wheat bread, brown rice and whole grain pasta (instead of white bread, rice, and pasta).  Get enough calcium and vitamin D. Check the label on foods and aim for 100% of the RDA (recommended daily allowance).  Lifestyle  Exercise at least 150 minutes each week  (30 minutes a day, 5 days a week).  Do muscle strengthening activities 2 days a week. These help control your weight and prevent disease.  No smoking.  Wear sunscreen to prevent skin cancer.  Have a dental exam and cleaning every 6 months.  Yearly exams  See your health care team every year to talk about:  Any changes in your health.  Any medicines your care team has prescribed.  Preventive care, family planning, and ways to prevent chronic diseases.  Shots (vaccines)   HPV shots (up to age 26), if you've never had them before.  Hepatitis B shots (up to age 59), if you've never had them before.  COVID-19 shot: Get this shot when it's due.  Flu shot: Get a flu shot every year.  Tetanus shot: Get a tetanus shot every 10 years.  Pneumococcal, hepatitis A, and RSV shots: Ask your care team if you need these based on your risk.  Shingles shot (for age 50 and up)  General health tests  Diabetes screening:  Starting at age 35, Get screened for diabetes at least every 3 years.  If you are younger than age 35, ask your care team if you should be screened for diabetes.  Cholesterol test: At age 39, start having a cholesterol test every 5 years, or more often if advised.  Bone density scan (DEXA): At age 50, ask your care team if you should have this scan for osteoporosis (brittle bones).  Hepatitis C: Get tested at least once in your life.  STIs (sexually  transmitted infections)  Before age 24: Ask your care team if you should be screened for STIs.  After age 24: Get screened for STIs if you're at risk. You are at risk for STIs (including HIV) if:  You are sexually active with more than one person.  You don't use condoms every time.  You or a partner was diagnosed with a sexually transmitted infection.  If you are at risk for HIV, ask about PrEP medicine to prevent HIV.  Get tested for HIV at least once in your life, whether you are at risk for HIV or not.  Cancer screening tests  Cervical cancer screening: If you have a cervix, begin getting regular cervical cancer screening tests starting at age 21.  Breast cancer scan (mammogram): If you've ever had breasts, begin having regular mammograms starting at age 40. This is a scan to check for breast cancer.  Colon cancer screening: It is important to start screening for colon cancer at age 45.  Have a colonoscopy test every 10 years (or more often if you're at risk) Or, ask your provider about stool tests like a FIT test every year or Cologuard test every 3 years.  To learn more about your testing options, visit:   .  For help making a decision, visit:   https://bit.ly/dr72535.  Prostate cancer screening test: If you have a prostate, ask your care team if a prostate cancer screening test (PSA) at age 55 is right for you.  Lung cancer screening: If you are a current or former smoker ages 50 to 80, ask your care team if ongoing lung cancer screenings are right for you.  For informational purposes only. Not to replace the advice of your health care provider. Copyright   2023 [employer]. All rights reserved. Clinically reviewed by the [facility]. [employer] [id number] - REV 01/24.

## 2025-04-22 LAB
ALBUMIN SERPL BCG-MCNC: 4.7 G/DL (ref 3.5–5.2)
ALP SERPL-CCNC: 116 U/L (ref 40–150)
ALT SERPL W P-5'-P-CCNC: 26 U/L (ref 0–50)
ANION GAP SERPL CALCULATED.3IONS-SCNC: 11 MMOL/L (ref 7–15)
AST SERPL W P-5'-P-CCNC: 24 U/L (ref 0–45)
BILIRUB SERPL-MCNC: 0.3 MG/DL
BUN SERPL-MCNC: 10.1 MG/DL (ref 6–20)
CALCIUM SERPL-MCNC: 9.7 MG/DL (ref 8.8–10.4)
CHLORIDE SERPL-SCNC: 103 MMOL/L (ref 98–107)
CHOLEST SERPL-MCNC: 213 MG/DL
CREAT SERPL-MCNC: 0.67 MG/DL (ref 0.51–0.95)
EGFRCR SERPLBLD CKD-EPI 2021: >90 ML/MIN/1.73M2
FASTING STATUS PATIENT QL REPORTED: YES
FASTING STATUS PATIENT QL REPORTED: YES
GLUCOSE SERPL-MCNC: 91 MG/DL (ref 70–99)
HCO3 SERPL-SCNC: 26 MMOL/L (ref 22–29)
HDLC SERPL-MCNC: 60 MG/DL
LDLC SERPL CALC-MCNC: 130 MG/DL
NONHDLC SERPL-MCNC: 153 MG/DL
POTASSIUM SERPL-SCNC: 4 MMOL/L (ref 3.4–5.3)
PROT SERPL-MCNC: 7.5 G/DL (ref 6.4–8.3)
SODIUM SERPL-SCNC: 140 MMOL/L (ref 135–145)
TRIGL SERPL-MCNC: 116 MG/DL
VIT D+METAB SERPL-MCNC: 28 NG/ML (ref 20–50)

## 2025-04-23 NOTE — RESULT ENCOUNTER NOTE
Giuliana,    I have reviewed your lab results below:    - cholesterol is stable from last year. Recommend adhering to a heart healthy diet (Mediterranean diet is a great example) and getting regular exercise (at least 150 minutes per week).  Recheck 1 year  - electrolytes, blood sugar, kidney and liver function normal   - vitamin D is within normal limits   - blood counts are normal - normal hemoglobin/red blood cells (no anemia), normal white blood cells (infection fighting cells), normal platelets (affect ability to clot normally)      Please let me know if you have any further questions.    Take care,  Jeanette Fatima PA-C on 4/23/2025 at 8:13 AM

## 2025-05-08 ENCOUNTER — HOSPITAL ENCOUNTER (OUTPATIENT)
Dept: MAMMOGRAPHY | Facility: CLINIC | Age: 55
Discharge: HOME | End: 2025-05-08
Attending: INTERNAL MEDICINE
Payer: COMMERCIAL

## 2025-05-08 DIAGNOSIS — C50.211 MALIGNANT NEOPLASM OF UPPER-INNER QUADRANT OF RIGHT BREAST IN FEMALE, ESTROGEN RECEPTOR POSITIVE (H): ICD-10-CM

## 2025-05-08 DIAGNOSIS — Z17.0 MALIGNANT NEOPLASM OF UPPER-INNER QUADRANT OF RIGHT BREAST IN FEMALE, ESTROGEN RECEPTOR POSITIVE (H): ICD-10-CM

## 2025-05-08 PROCEDURE — 77066 DX MAMMO INCL CAD BI: CPT

## 2025-05-30 NOTE — PROGRESS NOTES
[facility]    Hematology/Oncology Established Patient Note      Today's Date: 6/5/2025    Reason for visit: Right breast cancer.    HISTORY OF PRESENT ILLNESS: Giuliana Cameron is a 54 year old female who presents with the following oncologic history:  1.  8/28/2024: Screening mammogram showed mass in upper inner right breast.  Left breast negative.  2.  9/4/2024: Right breast ultrasound showed 1.6 x 1.1 x 1 cm mass at 1:00, 8 cm from nipple.  Right axillary ultrasound showed normal-appearing nodes.  3.  9/4/2024: Right breast biopsy at 1:00 showed grade 1 invasive ductal carcinoma measuring at least 9 mm in greatest linear extent, no LVI, grade 2 DCIS present, ER strongly positive at 100%, TX strongly positive at 100%, HER2 negative with IHC = 1+.  4.  9/23/2024: Right breast lumpectomy with right axillary sentinel lymph node excision under care of Dr. Geoff Hurst.  Pathology showed 1.6 x 1.2 x 1 cm grade 1 invasive ductal carcinoma, grade 2 DCIS estimated at 3.9 cm with invasive carcinoma located less than 0.1 cm from closest inferior margin and DCIS located less than 0.1 cm from closest posterior margin.  Total 1 right axillary sentinel lymph node negative for carcinoma.  Reexcision of inferior margin showed multiple foci of DCIS present at new margin. Oncotype DX pending.  5.  9/30/2024: Breast MRI showed hematoma at right upper inner breast spanning 8 cm and surrounding non-mass like enhancement reflecting postoperative changes.  No right axillary or right internal mammary chain lymphadenopathy.  Left breast negative.  6.  10/4/2024: Reexcision of positive margin in right breast.  Pathology showed residual foci of grade 2 DCIS, microscopic foci of DCIS ranging from 0.1 mm to 0.8 mm, DCIS is 1.3 mm from new inferior margin. Oncotype DX = 9, 9-year risk of distant recurrence of 3% after 5 years of hormone blockade therapy, < 1% absolute chemo benefit.  7. 11/11/2024-11/15/2024: Completed  radiation to right breast, 2600 cGy in 5 fractions.  8. 2024: Started anastrozole.    INTERVAL HISTORY:  Giuliana reports unusual thirst in the late evening and during the night.  She has been trying to increase fruit with high water intake. She has intermittent right hand tingling that changes with position of her arm.    REVIEW OF SYSTEMS:   14 point ROS was reviewed and is negative other than as noted above in HPI.       HOME MEDICATIONS:  Current Outpatient Medications   Medication Sig Dispense Refill    anastrozole (ARIMIDEX) 1 MG tablet Take 1 tablet (1 mg) by mouth daily. 90 tablet 3    ASHWAGANDHA PO Take by mouth daily.      Calcium 250 MG CAPS Take by mouth.      Vitamin D, Cholecalciferol, 25 MCG (1000 UT) CAPS Take by mouth.           ALLERGIES:  Allergies   Allergen Reactions    No Known Allergies          PAST MEDICAL HISTORY:  Past Medical History:   Diagnosis Date    Dyspepsia and other specified disorders of function of stomach 2003    pos H.pylori tx'ed    PONV (postoperative nausea and vomiting)     Vitamin D deficiencies 2011   Did not use IUD, OCPs, or HRT.      PAST SURGICAL HISTORY:  Past Surgical History:   Procedure Laterality Date    BIOPSY NODE SENTINEL Right 2024    Procedure: sentinel lymph node biopsy;  Surgeon: Geoff Hurst MD;  Location:  OR    LAPAROSCOPIC CHOLECYSTECTOMY  2004    Cholecystectomy, Laparoscopic    LUMPECTOMY BREAST Right 10/4/2024    Procedure: positive margin re-excision right breast;  Surgeon: Geoff Hurst MD;  Location:  OR    LUMPECTOMY, BREAST, LOCALIZED USING RADIOFREQUENCY IDENTIFICATION Right 2024    Procedure: LUMPECTOMY, BREAST, LOCALIZED USING RADIOFREQUENCY IDENTIFICATION,;  Surgeon: Geoff Hurst MD;  Location:  OR    SHOULDER SURGERY      [facility]  DELIVERY ONLY  2004    , Low Cervical   Right shoulder surgery.      SOCIAL HISTORY:  Social History     Socioeconomic  History    Marital status:      Spouse name: Not on file    Number of children: 2    Years of education: Not on file    Highest education level: Not on file   Occupational History    Occupation:      Employer: [employer]   Tobacco Use    Smoking status: Never    Smokeless tobacco: Never   Vaping Use    Vaping status: Never Used   Substance and Sexual Activity    Alcohol use: No    Drug use: No    Sexual activity: Yes     Partners: Male     Birth control/protection: Condom   Other Topics Concern     Service No    Blood Transfusions No    Caffeine Concern No    Occupational Exposure No    Hobby Hazards No    Sleep Concern No    Stress Concern Yes    Weight Concern Yes    Special Diet No    Back Care No    Exercise Yes     Comment: aerobics, treadmill, weights    Bike Helmet Yes    Seat Belt Yes    Self-Exams No    Parent/sibling w/ CABG, MI or angioplasty before 65F 55M? Not Asked   Social History Narrative    , 2 kids, non smoker, alcohol none, working full time      Social Drivers of Health     Financial Resource Strain: Low Risk  (4/21/2025)    Financial Resource Strain     Within the past 12 months, have you or your family members you live with been unable to get utilities (heat, electricity) when it was really needed?: No   Food Insecurity: Low Risk  (4/21/2025)    Food Insecurity     Within the past 12 months, did you worry that your food would run out before you got money to buy more?: No     Within the past 12 months, did the food you bought just not last and you didn t have money to get more?: No   Transportation Needs: Low Risk  (4/21/2025)    Transportation Needs     Within the past 12 months, has lack of transportation kept you from medical appointments, getting your medicines, non-medical meetings or appointments, work, or from getting things that you need?: No   Physical Activity: Insufficiently Active (4/21/2025)    Exercise Vital Sign     Days of Exercise per Week:  4 days     Minutes of Exercise per Session: 30 min   Stress: No Stress Concern Present (2025)    [employer] [place] [place] [employer] - Occupational Stress Questionnaire     Feeling of Stress : Only a little   Social Connections: Unknown (2025)    Social Connection and Isolation Panel [NHANES]     Frequency of Communication with Friends and Family: Not on file     Frequency of Social Gatherings with Friends and Family: Once a week     Attends Pent[employer]al Services: Not on file     Active Member of Clubs or Organizations: Not on file     Attends Club or Organization Meetings: Not on file     Marital Status: Not on file   Interpersonal Safety: Low Risk  (2025)    Interpersonal Safety     Do you feel physically and emotionally safe where you currently live?: Yes     Within the past 12 months, have you been hit, slapped, kicked or otherwise physically hurt by someone?: No     Within the past 12 months, have you been humiliated or emotionally abused in other ways by your partner or ex-partner?: No   Housing Stability: Low Risk  (2025)    Housing Stability     Do you have housing? : Yes     Are you worried about losing your housing?: No         FAMILY HISTORY:  Family History   Problem Relation Age of Onset    Diabetes Mother     Hypertension Mother     Diabetes Father     Hypertension Father     C.A.D. Father          of MI at age of 70    Breast Cancer Maternal Grandmother         at age 65+, unsure ?    Breast Cancer Paternal Grandmother         55+    Cancer - colorectal No family hx of          PHYSICAL EXAM:  Vital signs:  /73   Pulse 69   Temp 97.9  F (36.6  C) (Oral)   Resp 16   Wt 64.9 kg (143 lb)   LMP 2017 (Approximate)   SpO2 100%   BMI 26.76 kg/m     ECO  GENERAL/CONSTITUTIONAL: No acute distress.  EYES: No erythema or scleral icterus.  ENT/MOUTH: Neck supple.  LYMPH: No cervical, supraclavicular, axillary adenopathy.   BREAST: No palpable masses in either  breast. Nipples are everted bilaterally with no discharge. No erythema, ulceration, or dimpling of the skin.   RESPIRATORY: No audible cough or wheezing.   GASTROINTESTINAL: No guarding.  No distention.  MUSCULOSKELETAL: Warm and well-perfused, no cyanosis, clubbing, or edema.  NEUROLOGIC: No focal motor deficits. Alert, oriented, answers questions appropriately.  INTEGUMENTARY: No rashes or jaundice.  GAIT: Steady, does not use assistive device       LABS:  CBC RESULTS:   Recent Labs   Lab Test 04/21/25  1034   WBC 4.4   RBC 4.49   HGB 13.1   HCT 37.4   MCV 83   MCH 29.2   MCHC 35.0   RDW 13.0         Last Comprehensive Metabolic Panel:  Sodium   Date Value Ref Range Status   04/21/2025 140 135 - 145 mmol/L Final   04/18/2019 139 133 - 144 mmol/L Final     Potassium   Date Value Ref Range Status   04/21/2025 4.0 3.4 - 5.3 mmol/L Final   10/17/2022 4.0 3.4 - 5.3 mmol/L Final   04/18/2019 4.3 3.4 - 5.3 mmol/L Final     Chloride   Date Value Ref Range Status   04/21/2025 103 98 - 107 mmol/L Final   10/17/2022 107 94 - 109 mmol/L Final   04/18/2019 107 94 - 109 mmol/L Final     Carbon Dioxide   Date Value Ref Range Status   04/18/2019 23 20 - 32 mmol/L Final     Carbon Dioxide (CO2)   Date Value Ref Range Status   04/21/2025 26 22 - 29 mmol/L Final   10/17/2022 26 20 - 32 mmol/L Final     Anion Gap   Date Value Ref Range Status   04/21/2025 11 7 - 15 mmol/L Final   10/17/2022 5 3 - 14 mmol/L Final   04/18/2019 9 3 - 14 mmol/L Final     Glucose   Date Value Ref Range Status   04/21/2025 91 70 - 99 mg/dL Final   10/17/2022 98 70 - 99 mg/dL Final   04/07/2021 107 (H) 70 - 99 mg/dL Final     Comment:     Fasting specimen     Urea Nitrogen   Date Value Ref Range Status   04/21/2025 10.1 6.0 - 20.0 mg/dL Final   10/17/2022 10 7 - 30 mg/dL Final   04/18/2019 12 7 - 30 mg/dL Final     Creatinine   Date Value Ref Range Status   04/21/2025 0.67 0.51 - 0.95 mg/dL Final   04/18/2019 0.72 0.52 - 1.04 mg/dL Final     GFR  Estimate   Date Value Ref Range Status   04/21/2025 >90 >60 mL/min/1.73m2 Final     Comment:     eGFR calculated using 2021 CKD-EPI equation.   04/18/2019 >90 >60 mL/min/[1.73_m2] Final     Comment:     Non  GFR Calc  Starting 12/18/2018, serum creatinine based estimated GFR (eGFR) will be   calculated using the Chronic Kidney Disease Epidemiology Collaboration   (CKD-EPI) equation.       Calcium   Date Value Ref Range Status   04/21/2025 9.7 8.8 - 10.4 mg/dL Final   04/18/2019 9.2 8.5 - 10.1 mg/dL Final     Bilirubin Total   Date Value Ref Range Status   04/21/2025 0.3 <=1.2 mg/dL Final   04/18/2019 0.4 0.2 - 1.3 mg/dL Final     Alkaline Phosphatase   Date Value Ref Range Status   04/21/2025 116 40 - 150 U/L Final   04/18/2019 96 40 - 150 U/L Final     ALT   Date Value Ref Range Status   04/21/2025 26 0 - 50 U/L Final   04/18/2019 51 (H) 0 - 50 U/L Final     AST   Date Value Ref Range Status   04/21/2025 24 0 - 45 U/L Final   04/18/2019 35 0 - 45 U/L Final       PATHOLOGY:  Reviewed as per HPI.    IMAGING:  Reviewed as per HPI.    ASSESSMENT/PLAN:  Giuliana Cameron is a 54 year old female with the following issues:  1.  Stage IA, [id number], grade 1 invasive ductal carcinoma of right upper inner breast, ER positive, OK positive, HER2 negative, Oncotype DX = 9  2. Right hand tingling, intermittent  -Oncotype DX = 9, with 9-year risk of distant recurrence of 3% after 5 years of hormone blockade therapy, < 1% absolute chemo benefit. Reiterated excellent prognosis.  --She is postmenopausal and has not had a menstrual period since her late 40's.    -She is s/p adjuvant radiation therapy and currently on adjuvant anastrozole, tolerating well so far.  --She has no clinical evidence for recurrent breast cancer by physical exam today or mammogram reviewed from 5/8/2025. Discussed applying gentle massage to right axilla and right breast to break up scar tissue to alleviate discomfort.  --Due for next  mammogram 5/2026.  --Advised total 5 years of anastrozole.    3. Osteopenia  --10/24/2024 baseline DEXA scan showed osteopenia.  --Advised adequate calcium, vitamin D, and weight bearing exercise.  --Her 10-year probability of major osteoporotic fracture is not yet high enough to warrant bisphosphonate therapy. Will repeat DEXA in 10/2026.    4. Increased thirst in evening  --I advised increasing hydration during the day.    Return in 4 months.    Funmi Langford MD  [facility] Hematology/Oncology    Total time spent today: 30 minutes in chart review, patient evaluation, counseling, documentation, test and/or medication/prescription orders, and coordination of care.      The longitudinal plan of care for the diagnosis(es)/condition(s) as documented were addressed during this visit. Due to the added complexity in care, I will continue to support Giuliana in the subsequent management and with ongoing continuity of care.

## 2025-06-05 ENCOUNTER — ONCOLOGY VISIT (OUTPATIENT)
Dept: ONCOLOGY | Facility: CLINIC | Age: 55
End: 2025-06-05
Attending: INTERNAL MEDICINE
Payer: COMMERCIAL

## 2025-06-05 VITALS
OXYGEN SATURATION: 100 % | DIASTOLIC BLOOD PRESSURE: 73 MMHG | HEART RATE: 69 BPM | SYSTOLIC BLOOD PRESSURE: 111 MMHG | BODY MASS INDEX: 26.76 KG/M2 | WEIGHT: 143 LBS | TEMPERATURE: 97.9 F | RESPIRATION RATE: 16 BRPM

## 2025-06-05 DIAGNOSIS — Z17.0 MALIGNANT NEOPLASM OF UPPER-INNER QUADRANT OF RIGHT BREAST IN FEMALE, ESTROGEN RECEPTOR POSITIVE (H): Primary | ICD-10-CM

## 2025-06-05 DIAGNOSIS — C50.211 MALIGNANT NEOPLASM OF UPPER-INNER QUADRANT OF RIGHT BREAST IN FEMALE, ESTROGEN RECEPTOR POSITIVE (H): Primary | ICD-10-CM

## 2025-06-05 DIAGNOSIS — R20.2 PARESTHESIAS: ICD-10-CM

## 2025-06-05 DIAGNOSIS — M85.89 OSTEOPENIA OF MULTIPLE SITES: ICD-10-CM

## 2025-06-05 PROCEDURE — 99213 OFFICE O/P EST LOW 20 MIN: CPT | Performed by: INTERNAL MEDICINE

## 2025-06-05 ASSESSMENT — PAIN SCALES - GENERAL: PAINLEVEL_OUTOF10: NO PAIN (0)

## 2025-06-05 NOTE — LETTER
6/5/2025      Giuliana Cameron  [address] [facility]  [place] [address]      Dear Colleague,    Thank you for referring your patient, Giuliana Cameron, to the [facility] CANC[facility]. Please see a copy of my visit note below.    [facility] Cancer Care    Hematology/Oncology Established Patient Note      Today's Date: 6/5/2025    Reason for visit: Right breast cancer.    HISTORY OF PRESENT ILLNESS: Giuliana Cameron is a 54 year old female who presents with the following oncologic history:  1.  8/28/2024: Screening mammogram showed mass in upper inner right breast.  Left breast negative.  2. 9/4/2024: Right breast ultrasound showed 1.6 x 1.1 x 1 cm mass at 1:00, 8 cm from nipple.  Right axillary ultrasound showed normal-appearing nodes.  3.  9/4/2024: Right breast biopsy at 1:00 showed grade 1 invasive ductal carcinoma measuring at least 9 mm in greatest linear extent, no LVI, grade 2 DCIS present, ER strongly positive at 100%, TX strongly positive at 100%, HER2 negative with IHC = 1+.  4.  9/23/2024: Right breast lumpectomy with right axillary sentinel lymph node excision under care of Dr. Geoff Hurts.  Pathology showed 1.6 x 1.2 x 1 cm grade 1 invasive ductal carcinoma, grade 2 DCIS estimated at 3.9 cm with invasive carcinoma located less than 0.1 cm from closest inferior margin and DCIS located less than 0.1 cm from closest posterior margin.  Total 1 right axillary sentinel lymph node negative for carcinoma.  Reexcision of inferior margin showed multiple foci of DCIS present at new margin. Oncotype DX pending.  5. 9/30/2024: Breast MRI showed hematoma at right upper inner breast spanning 8 cm and surrounding non-mass like enhancement reflecting postoperative changes.  No right axillary or right internal mammary chain lymphadenopathy.  Left breast negative.  6.  10/4/2024: Reexcision of positive margin in right breast.  Pathology showed residual foci of grade 2 DCIS,  microscopic foci of DCIS ranging from 0.1 mm to 0.8 mm, DCIS is 1.3 mm from new inferior margin. Oncotype DX = 9, 9-year risk of distant recurrence of 3% after 5 years of hormone blockade therapy, < 1% absolute chemo benefit.  7. 11/11/2024-11/15/2024: Completed radiation to right breast, 2600 cGy in 5 fractions.  8. 11/22/2024: Started anastrozole.    INTERVAL HISTORY:  Giuliana reports unusual thirst in the late evening and during the night.  She has been trying to increase fruit with high water intake. She has intermittent right hand tingling that changes with position of her arm.    REVIEW OF SYSTEMS:   14 point ROS was reviewed and is negative other than as noted above in HPI.       HOME MEDICATIONS:  Current Outpatient Medications   Medication Sig Dispense Refill     anastrozole (ARIMIDEX) 1 MG tablet Take 1 tablet (1 mg) by mouth daily. 90 tablet 3     ASHWAGANDHA PO Take by mouth daily.       Calcium 250 MG CAPS Take by mouth.       Vitamin D, Cholecalciferol, 25 MCG (1000 UT) CAPS Take by mouth.           ALLERGIES:  Allergies   Allergen Reactions     No Known Allergies          PAST MEDICAL HISTORY:  Past Medical History:   Diagnosis Date     Dyspepsia and other specified disorders of function of stomach 01/01/2003    pos H.pylori tx'ed     PONV (postoperative nausea and vomiting)      Vitamin D deficiencies 01/01/2011   Did not use IUD, OCPs, or HRT.      PAST SURGICAL HISTORY:  Past Surgical History:   Procedure Laterality Date     BIOPSY NODE SENTINEL Right 9/23/2024    Procedure: sentinel lymph node biopsy;  Surgeon: Geoff Hurst MD;  Location:  OR     LAPAROSCOPIC CHOLECYSTECTOMY  06/01/2004    Cholecystectomy, Laparoscopic     LUMPECTOMY BREAST Right 10/4/2024    Procedure: positive margin re-excision right breast;  Surgeon: Geoff Husrt MD;  Location:  OR     LUMPECTOMY, BREAST, LOCALIZED USING RADIOFREQUENCY IDENTIFICATION Right 9/23/2024    Procedure: LUMPECTOMY, BREAST,  LOCALIZED USING RADIOFREQUENCY IDENTIFICATION,;  Surgeon: Geoff Hurst MD;  Location: [facility] OR     SHOULDER SURGERY       ZZC  DELIVERY ONLY  2004    , Low Cervical   Right shoulder surgery.      SOCIAL HISTORY:  Social History     Socioeconomic History     Marital status:      Spouse name: Not on file     Number of children: 2     Years of education: Not on file     Highest education level: Not on file   Occupational History     Occupation:      Employer: [employer]   Tobacco Use     Smoking status: Never     Smokeless tobacco: Never   Vaping Use     Vaping status: Never Used   Substance and Sexual Activity     Alcohol use: No     Drug use: No     Sexual activity: Yes     Partners: Male     Birth control/protection: Condom   Other Topics Concern      Service No     Blood Transfusions No     Caffeine Concern No     Occupational Exposure No     Hobby Hazards No     Sleep Concern No     Stress Concern Yes     Weight Concern Yes     Special Diet No     Back Care No     Exercise Yes     Comment: aerobics, treadmill, weights     Bike Helmet Yes     Seat Belt Yes     Self-Exams No     Parent/sibling w/ CABG, MI or angioplasty before 65F 55M? Not Asked   Social History Narrative    , 2 kids, non smoker, alcohol none, working full time      Social Drivers of Health     Financial Resource Strain: Low Risk  (2025)    Financial Resource Strain      Within the past 12 months, have you or your family members you live with been unable to get utilities (heat, electricity) when it was really needed?: No   Food Insecurity: Low Risk  (2025)    Food Insecurity      Within the past 12 months, did you worry that your food would run out before you got money to buy more?: No      Within the past 12 months, did the food you bought just not last and you didn t have money to get more?: No   Transportation Needs: Low Risk  (2025)    Transportation Needs       Within the past 12 months, has lack of transportation kept you from medical appointments, getting your medicines, non-medical meetings or appointments, work, or from getting things that you need?: No   Physical Activity: Insufficiently Active (2025)    Exercise Vital Sign      Days of Exercise per Week: 4 days      Minutes of Exercise per Session: 30 min   Stress: No Stress Concern Present (2025)    [employer] - Occupational Stress Questionnaire      Feeling of Stress : Only a little   Social Connections: Unknown (2025)    Social Connection and Isolation Panel [NHANES]      Frequency of Communication with Friends and Family: Not on file      Frequency of Social Gatherings with Friends and Family: Once a week      Attends [employer] Services: Not on file      Active Member of Clubs or Organizations: Not on file      Attends Club or Organization Meetings: Not on file      Marital Status: Not on file   Interpersonal Safety: Low Risk  (2025)    Interpersonal Safety      Do you feel physically and emotionally safe where you currently live?: Yes      Within the past 12 months, have you been hit, slapped, kicked or otherwise physically hurt by someone?: No      Within the past 12 months, have you been humiliated or emotionally abused in other ways by your partner or ex-partner?: No   Housing Stability: Low Risk  (2025)    Housing Stability      Do you have housing? : Yes      Are you worried about losing your housing?: No         FAMILY HISTORY:  Family History   Problem Relation Age of Onset     Diabetes Mother      Hypertension Mother      Diabetes Father      Hypertension Father      C.A.D. Father          of MI at age of 70     Breast Cancer Maternal Grandmother         at age 65+, unsure ?     Breast Cancer Paternal Grandmother         55+     Cancer - colorectal No family hx of          PHYSICAL EXAM:  Vital signs:  /73   Pulse 69   Temp 97.9  F (36.6   C) (Oral)   Resp 16   Wt 64.9 kg (143 lb)   LMP 2017 (Approximate)   SpO2 100%   BMI 26.76 kg/m     ECO  GENERAL/CONSTITUTIONAL: No acute distress.  EYES: No erythema or scleral icterus.  ENT/MOUTH: Neck supple.  LYMPH: No cervical, supraclavicular, axillary adenopathy.   BREAST: No palpable masses in either breast. Nipples are everted bilaterally with no discharge. No erythema, ulceration, or dimpling of the skin.   RESPIRATORY: No audible cough or wheezing.   GASTROINTESTINAL: No guarding.  No distention.  MUSCULOSKELETAL: Warm and well-perfused, no cyanosis, clubbing, or edema.  NEUROLOGIC: No focal motor deficits. Alert, oriented, answers questions appropriately.  INTEGUMENTARY: No rashes or jaundice.  GAIT: Steady, does not use assistive device       LABS:  CBC RESULTS:   Recent Labs   Lab Test 25  1034   WBC 4.4   RBC 4.49   HGB 13.1   HCT 37.4   MCV 83   MCH 29.2   MCHC 35.0   RDW 13.0         Last Comprehensive Metabolic Panel:  Sodium   Date Value Ref Range Status   2025 140 135 - 145 mmol/L Final   2019 139 133 - 144 mmol/L Final     Potassium   Date Value Ref Range Status   2025 4.0 3.4 - 5.3 mmol/L Final   10/17/2022 4.0 3.4 - 5.3 mmol/L Final   2019 4.3 3.4 - 5.3 mmol/L Final     Chloride   Date Value Ref Range Status   2025 103 98 - 107 mmol/L Final   10/17/2022 107 94 - 109 mmol/L Final   2019 107 94 - 109 mmol/L Final     Carbon Dioxide   Date Value Ref Range Status   2019 23 20 - 32 mmol/L Final     Carbon Dioxide (CO2)   Date Value Ref Range Status   2025 26 22 - 29 mmol/L Final   10/17/2022 26 20 - 32 mmol/L Final     Anion Gap   Date Value Ref Range Status   2025 11 7 - 15 mmol/L Final   10/17/2022 5 3 - 14 mmol/L Final   2019 9 3 - 14 mmol/L Final     Glucose   Date Value Ref Range Status   2025 91 70 - 99 mg/dL Final   10/17/2022 98 70 - 99 mg/dL Final   2021 107 (H) 70 - 99 mg/dL Final      Comment:     Fasting specimen     Urea Nitrogen   Date Value Ref Range Status   04/21/2025 10.1 6.0 - 20.0 mg/dL Final   10/17/2022 10 7 - 30 mg/dL Final   04/18/2019 12 7 - 30 mg/dL Final     Creatinine   Date Value Ref Range Status   04/21/2025 0.67 0.51 - 0.95 mg/dL Final   04/18/2019 0.72 0.52 - 1.04 mg/dL Final     GFR Estimate   Date Value Ref Range Status   04/21/2025 >90 >60 mL/min/1.73m2 Final     Comment:     eGFR calculated using 2021 CKD-EPI equation.   04/18/2019 >90 >60 mL/min/[1.73_m2] Final     Comment:     Non  GFR Calc  Starting 12/18/2018, serum creatinine based estimated GFR (eGFR) will be   calculated using the Chronic Kidney Disease Epidemiology Collaboration   (CKD-EPI) equation.       Calcium   Date Value Ref Range Status   04/21/2025 9.7 8.8 - 10.4 mg/dL Final   04/18/2019 9.2 8.5 - 10.1 mg/dL Final     Bilirubin Total   Date Value Ref Range Status   04/21/2025 0.3 <=1.2 mg/dL Final   04/18/2019 0.4 0.2 - 1.3 mg/dL Final     Alkaline Phosphatase   Date Value Ref Range Status   04/21/2025 116 40 - 150 U/L Final   04/18/2019 96 40 - 150 U/L Final     ALT   Date Value Ref Range Status   04/21/2025 26 0 - 50 U/L Final   04/18/2019 51 (H) 0 - 50 U/L Final     AST   Date Value Ref Range Status   04/21/2025 24 0 - 45 U/L Final   04/18/2019 35 0 - 45 U/L Final       PATHOLOGY:  Reviewed as per HPI.    IMAGING:  Reviewed as per HPI.    ASSESSMENT/PLAN:  Giuliana Cameron is a 54 year old female with the following issues:  1.  Stage IA, [id number], grade 1 invasive ductal carcinoma of right upper inner breast, ER positive, DE positive, HER2 negative, Oncotype DX = 9  2. Right hand tingling, intermittent  -Oncotype DX = 9, with 9-year risk of distant recurrence of 3% after 5 years of hormone blockade therapy, < 1% absolute chemo benefit. Reiterated excellent prognosis.  --She is postmenopausal and has not had a menstrual period since her late 40's.    -She is s/p adjuvant radiation  "therapy and currently on adjuvant anastrozole, tolerating well so far.  --She has no clinical evidence for recurrent breast cancer by physical exam today or mammogram reviewed from 5/8/2025. Discussed applying gentle massage to right axilla and right breast to break up scar tissue to alleviate discomfort.  --Due for next mammogram 5/2026.  --Advised total 5 years of anastrozole.    3. Osteopenia  --10/24/2024 baseline DEXA scan showed osteopenia.  --Advised adequate calcium, vitamin D, and weight bearing exercise.  --Her 10-year probability of major osteoporotic fracture is not yet high enough to warrant bisphosphonate therapy. Will repeat DEXA in 10/2026.    4. Increased thirst in evening  --I advised increasing hydration during the day.    Return in 4 months.    Funmi Langford MD  [facility] Hematology/Oncology    Total time spent today: 30 minutes in chart review, patient evaluation, counseling, documentation, test and/or medication/prescription orders, and coordination of care.      The longitudinal plan of care for the diagnosis(es)/condition(s) as documented were addressed during this visit. Due to the added complexity in care, I will continue to support Giuliana in the subsequent management and with ongoing continuity of care.    Oncology Rooming Note    June 5, 2025 3:24 PM   Giuliana Cameron is a 54 year old female who presents for:    Chief Complaint   Patient presents with     Oncology Clinic Visit     Initial Vitals: /73   Pulse 69   Temp 97.9  F (36.6  C) (Oral)   Resp 16   Wt 64.9 kg (143 lb)   LMP 06/20/2017 (Approximate)   SpO2 100%   BMI 26.76 kg/m   Estimated body mass index is 26.76 kg/m  as calculated from the following:    Height as of 4/21/25: 1.557 m (5' 1.3\").    Weight as of this encounter: 64.9 kg (143 lb). Body surface area is 1.68 meters squared.  No Pain (0) Comment: Data Unavailable   Patient's last menstrual period was 06/20/2017 (approximate).  Allergies " reviewed: Yes  Medications reviewed: Yes    Medications: Medication refills not needed today.  Pharmacy name entered into [employer]: [facility] [address] [facility] [place] [facility] [address]    Frailty Screening:   Is the patient here for a new oncology consult visit in cancer care? 2. No    PHQ9:  Did this patient require a PHQ9?: No      Clinical concerns:  doctor was notified.      Miranda Lane CMA                Again, thank you for allowing me to participate in the care of your patient.        Sincerely,        Funmi Langford MD    Electronically signed

## 2025-06-05 NOTE — PROGRESS NOTES
"Oncology Rooming Note    June 5, 2025 3:24 PM   Giuliana Cameron is a 54 year old female who presents for:    Chief Complaint   Patient presents with    Oncology Clinic Visit     Initial Vitals: /73   Pulse 69   Temp 97.9  F (36.6  C) (Oral)   Resp 16   Wt 64.9 kg (143 lb)   LMP 06/20/2017 (Approximate)   SpO2 100%   BMI 26.76 kg/m   Estimated body mass index is 26.76 kg/m  as calculated from the following:    Height as of 4/21/25: 1.557 m (5' 1.3\").    Weight as of this encounter: 64.9 kg (143 lb). Body surface area is 1.68 meters squared.  No Pain (0) Comment: Data Unavailable   Patient's last menstrual period was 06/20/2017 (approximate).  Allergies reviewed: Yes  Medications reviewed: Yes    Medications: Medication refills not needed today.  Pharmacy name entered into [employer]: [facility] [address] [place] [address] [id number]    Frailty Screening:   Is the patient here for a new oncology consult visit in cancer care? 2. No    PHQ9:  Did this patient require a PHQ9?: No      Clinical concerns:  doctor was notified.      Miranda Lane CMA              "

## 2025-06-09 ENCOUNTER — TELEPHONE (OUTPATIENT)
Dept: ONCOLOGY | Facility: CLINIC | Age: 55
End: 2025-06-09
Payer: COMMERCIAL

## (undated) DEVICE — SU VICRYL 3-0 SH 27" J316H

## (undated) DEVICE — BNDG ELASTIC 6"X5YDS DOUBLE STERILE

## (undated) DEVICE — DRAPE BREAST/CHEST 29420

## (undated) DEVICE — GLOVE BIOGEL PI MICRO INDICATOR UNDERGLOVE SZ 7.5 48975

## (undated) DEVICE — NDL 19GA 1.5"

## (undated) DEVICE — CLIP ETHICON LIGACLIP SM BLUE LT100

## (undated) DEVICE — SU VICRYL 4-0 PS-2 18" UND J496H

## (undated) DEVICE — ESU GROUND PAD UNIVERSAL W/O CORD

## (undated) DEVICE — ESU ELEC BLADE 2.75" COATED/INSULATED E1455

## (undated) DEVICE — PAD CHUX UNDERPAD 23X24" 7136

## (undated) DEVICE — DRSG STERI STRIP 1/2X4" R1547

## (undated) DEVICE — SET BREAST BIOPSY LOCALIZER 20 PROBE 8MM PENCIL 09-0006

## (undated) DEVICE — BLADE KNIFE SURG 15 371115

## (undated) DEVICE — SU MONOCRYL 4-0 PS-2 18" UND Y496G

## (undated) DEVICE — NDL 25GA 1.5" 305127

## (undated) DEVICE — SLEEVE PROTECTIVE BREAST BIOPSY  GMSLV001-10

## (undated) DEVICE — SU SILK 2-0 FSL 18" 677G

## (undated) DEVICE — SUCTION CANISTER MEDIVAC LINER 3000ML W/LID 65651-530

## (undated) DEVICE — ESU PENCIL W/SMOKE EVAC NEPTUNE STRYKER 0703-046-000

## (undated) DEVICE — SU VICRYL+ 2-0 12X18IN VIO VCP105G

## (undated) DEVICE — DECANTER VIAL 2006S

## (undated) DEVICE — LINEN TOWEL PACK X5 5464

## (undated) DEVICE — PREP CHLORAPREP 26ML TINTED HI-LITE ORANGE 930815

## (undated) DEVICE — DECANTER BAG 2002S

## (undated) DEVICE — MARKER MARGIN MARKER STD 6 COLOR SGL USE MMS6

## (undated) DEVICE — PACK MINOR SBA15MIFSE

## (undated) DEVICE — SU VICRYL 2-0 TIE 12X18" J905T

## (undated) DEVICE — SYR 10ML FINGER CONTROL W/O NDL 309695

## (undated) DEVICE — DRSG GAUZE 4X4" 3033

## (undated) DEVICE — GLOVE BIOGEL PI MICRO SZ 7.5 48575

## (undated) DEVICE — SOL WATER IRRIG 1000ML BOTTLE 2F7114

## (undated) RX ORDER — FENTANYL CITRATE 50 UG/ML
INJECTION, SOLUTION INTRAMUSCULAR; INTRAVENOUS
Status: DISPENSED
Start: 2024-09-23

## (undated) RX ORDER — FENTANYL CITRATE 0.05 MG/ML
INJECTION, SOLUTION INTRAMUSCULAR; INTRAVENOUS
Status: DISPENSED
Start: 2024-09-23

## (undated) RX ORDER — FENTANYL CITRATE 0.05 MG/ML
INJECTION, SOLUTION INTRAMUSCULAR; INTRAVENOUS
Status: DISPENSED
Start: 2024-10-04

## (undated) RX ORDER — FENTANYL CITRATE 50 UG/ML
INJECTION, SOLUTION INTRAMUSCULAR; INTRAVENOUS
Status: DISPENSED
Start: 2024-10-04

## (undated) RX ORDER — PROPOFOL 10 MG/ML
INJECTION, EMULSION INTRAVENOUS
Status: DISPENSED
Start: 2024-10-04

## (undated) RX ORDER — EPHEDRINE SULFATE 50 MG/ML
INJECTION, SOLUTION INTRAMUSCULAR; INTRAVENOUS; SUBCUTANEOUS
Status: DISPENSED
Start: 2024-09-23

## (undated) RX ORDER — HYDROCODONE BITARTRATE AND ACETAMINOPHEN 5; 325 MG/1; MG/1
TABLET ORAL
Status: DISPENSED
Start: 2024-09-23

## (undated) RX ORDER — HYDROCODONE BITARTRATE AND ACETAMINOPHEN 5; 325 MG/1; MG/1
TABLET ORAL
Status: DISPENSED
Start: 2024-10-04

## (undated) RX ORDER — ONDANSETRON 4 MG/1
TABLET, ORALLY DISINTEGRATING ORAL
Status: DISPENSED
Start: 2024-09-23